# Patient Record
Sex: MALE | Race: BLACK OR AFRICAN AMERICAN
[De-identification: names, ages, dates, MRNs, and addresses within clinical notes are randomized per-mention and may not be internally consistent; named-entity substitution may affect disease eponyms.]

---

## 2018-06-06 ENCOUNTER — HOSPITAL ENCOUNTER (EMERGENCY)
Dept: HOSPITAL 15 - ER | Age: 43
Discharge: HOME | End: 2018-06-06
Payer: COMMERCIAL

## 2018-06-06 VITALS — HEIGHT: 74 IN | WEIGHT: 315 LBS | BODY MASS INDEX: 40.43 KG/M2

## 2018-06-06 VITALS — SYSTOLIC BLOOD PRESSURE: 126 MMHG | DIASTOLIC BLOOD PRESSURE: 84 MMHG

## 2018-06-06 DIAGNOSIS — J35.01: Primary | ICD-10-CM

## 2018-06-06 DIAGNOSIS — R35.8: ICD-10-CM

## 2018-06-06 DIAGNOSIS — K05.10: ICD-10-CM

## 2018-06-06 PROCEDURE — 99283 EMERGENCY DEPT VISIT LOW MDM: CPT

## 2018-06-06 PROCEDURE — 82962 GLUCOSE BLOOD TEST: CPT

## 2018-06-06 PROCEDURE — 96372 THER/PROPH/DIAG INJ SC/IM: CPT

## 2018-06-06 PROCEDURE — 81002 URINALYSIS NONAUTO W/O SCOPE: CPT

## 2018-10-15 ENCOUNTER — HOSPITAL ENCOUNTER (EMERGENCY)
Dept: HOSPITAL 15 - ER | Age: 43
Discharge: HOME | End: 2018-10-15
Payer: COMMERCIAL

## 2018-10-15 VITALS — SYSTOLIC BLOOD PRESSURE: 144 MMHG | DIASTOLIC BLOOD PRESSURE: 72 MMHG

## 2018-10-15 VITALS — BODY MASS INDEX: 40.43 KG/M2 | HEIGHT: 74 IN | WEIGHT: 315 LBS
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
## 2018-10-15 DIAGNOSIS — S83.91XA: Primary | ICD-10-CM
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
## 2018-10-15 PROCEDURE — 73562 X-RAY EXAM OF KNEE 3: CPT

## 2020-03-18 ENCOUNTER — HOSPITAL ENCOUNTER (EMERGENCY)
Dept: HOSPITAL 15 - ER | Age: 45
Discharge: HOME | End: 2020-03-18
Payer: MEDICAID

## 2020-03-18 VITALS — HEIGHT: 74 IN | WEIGHT: 315 LBS | BODY MASS INDEX: 40.43 KG/M2

## 2020-03-18 VITALS — SYSTOLIC BLOOD PRESSURE: 125 MMHG | DIASTOLIC BLOOD PRESSURE: 83 MMHG

## 2020-03-18 DIAGNOSIS — R19.7: Primary | ICD-10-CM

## 2025-02-21 NOTE — [facility]
CHEST RADIOGRAPH



Indication: sob



Technique: Single frontal view of the chest was obtained



Comparison: None



Findings/



IMPRESSION: 



Mild cardiomegaly otherwise no active cardiopulmonary disease. 



Electronically Signed by: José Miguel Cloud at 02/21/2025 22:50:16 PM

## 2025-02-21 NOTE — ED.PDOC
HPI Comments


49-year-old male emergency room by EMS for chest pain.  Patient is morbidly 

obese, states for the past few weeks, he has been having intermittent episodes 

of flu-like symptoms.  Two days ago patient started having shortness of breath, 

associated with substernal chest pains, pressure, burning,   constant, radiating

to his left arm and associated with palpitations. Upon arrival of paramedics 

noted patient to be at Afib in [facility]. Patient denies being diagnosed with Afib


Chief Complaint:  Chest Pain


Time Seen by MD:  22:32


Primary Care Provider:  unknown


Reviewed Notes:  Nurses Notes


Allergies:  


Coded Allergies:  


     NO KNOWN ALLERGIES (Unverified , 8/12/15)


Information Source:  Patient


Mode of Arrival:  EMS


Severity:  Moderate


Timing:  Days


Duration:  Intermittent


Prehospital treatment:  None


Location:  Substernal


Radiation:  Arm (L)


Quality:  Pressure, Burning


Onset:  With Light Exertion


Associated Signs and Symptoms:  SOB, Palpitations





Past Medical History


PAST MEDICAL HISTORY:  Denies


Surgical History:  Denies all surgeries





Family History


Family History:  Reviewed,noncontributory to illness





Social History


Smoker:  Non-Smoker


Alcohol:  Denies ETOH Use


Drugs:  Denies Drug Use


Lives In:  Home





Constitutional:  denies: chills, diaphoresis, fatigue, fever, malaise, sweats, 

weakness, others


EENTM:  denies: blurred vision, double vision, ear bleeding, ear discharge, ear 

drainage, ear pain, ear ringing, eye pain, eye redness, hearing loss, mouth 

pain, mouth swelling, nasal discharge, nose bleeding, nose congestion, nose 

pain, photophobia, tearing, throat pain, throat swelling, voice changes, others


Respiratory:  reports: SOB at rest, shortness of breath, SOB with excertion; 

denies: cough, hemoptysis, orthopnea, stridor, wheezing, others


Cardiovascular:  reports: chest pain, dizzy spells, diaphoresis, left arm pain, 

palpitations; denies: Dyspnea on exertion, edema, irregular heart beat, 

lightheadedness, PND, syncope, others


Gastrointestinal:  denies: abdomen distended, abdominal pain, blood streaked 

bowels, constipated, diarrhea, dysphagia, difficulty swallowing, hematemesis, 

melena, nausea, poor appetite, poor fluid intake, rectal bleeding, rectal pain, 

vomiting, others


Genitourinary:  denies: burning, dysuria, flank pain, frequency, hematuria, 

incontinence, penile discharge, penile sore, pain, testicle pain, testicle 

swelling, urgency, others


Neurological:  denies: dizziness, fainting, headache, left sided numbness, left 

sided weakness, numbness, paresthesia, pre-existing deficit, right sided 

numbness, right sided weakness, seizure, speech problems, tingling, tremors, 

weakness, others


Musculoskeletal:  denies: back pain, gout, joint pain, joint swelling, muscle 

pain, muscle stiffness, neck pain, others


Integumetry:  denies: bruises, change in color, change in hair/nails, dryness, 

laceration, lesions, lumps, rash, wounds, others


Allergic/Immunocompromised:  denies: Difficulty Healing, Frequent Infections, 

Hives, Itching, others


Hematologic/Lymphatic:  denies: anemia, blood clots, easy bleeding, easy 

bruising, swollen glands, others


Endocrine:  denies: excessive hunger, excessive sweating, excessive thirst, 

excessive urination, flushing, intolerance to cold, intolerance to heat, 

unexplained weight gain, unexplained weight loss, others


Psychiatric:  denies: anxiety, bipolar disorder, depression, hopeless, panic 

disorder, schizophrenia, sleepless, suicidal, others





Physical Exam


General Appearance:  No Apparent Distress, Normal


HEENT:  Normal ENT Inspection, Pharynx Normal, TMs Normal


Neck:  Full Range of Motion, Non-Tender, Normal, Normal Inspection


Respiratory:  Chest Non-Tender, Lungs Clear, No Accessory Muscle Use, No 

Respiratory Distress, Normal Breath Sounds


Cardiovascular:  No Edema, No JVD, No Murmur, No Gallop, Normal Peripheral 

Pulses, Regular Rate/Rhythm


Breast Exam:  Deferred


Gastrointestinal:  No Organomegaly, Non Tender, No Pulsatile Mass, Normal Bowel 

Sounds, Soft


Genitalia:  Deferred


Pelvic:  Deferred


Rectal:  Deferred


Extremities:  No calf tenderness, Normal capillary refill, Normal inspection, 

Normal range of motion, Non-tender, No pedal edema


Musculoskeletal :  


   Apperance:  Normal


Neurologic:  Alert, CNs II-XII nml as Tested, No Motor Deficits, Normal Affect, 

Normal Mood, No Sensory Deficits


Cerebellar Function:  Normal


Reflexes:  Normal


Skin:  Dry, Normal Color, Warm


Lymphatic:  No Adenopathy





EKG


EKG :  


   Pulse Rate (adult):  153


   Cardiac Rhythm:  Afib, VT


   Block:  RBBB


Comments


Left anterior fascicular block





Was a procedure done?


Was a procedure done?:  No





CP Differential Dx


Differential Diagnosis:  A-fib, Angina, Anxiety / Panic Attack, Electrolyte 

Disorder, Hyperventilation


Differential Diagnosis:  Angina, Chest Wall Pain, Costochondritis, Esophageal 

reflux/spasm, Gastritis, Myocardial Infarction





X-Ray, Labs, Meds, VS





                                   Vital Signs








  Date Time  Temp Pulse Resp B/P (MAP) Pulse Ox O2 Delivery O2 Flow Rate FiO2


 


2/21/25 23:01  133  110/76  Facial BiPAP Mask  45


 


2/21/25 22:32  153      


 


2/21/25 21:51 97.9 160 40 148/108 (121) 91   


 


2/21/25 21:49  153      








                                       Lab








Test


 2/22/25


00:39 2/22/25


00:14 2/21/25


23:15 2/21/25


23:02 Range/Units


 


 


Blood Gas Specimen Type Arterial      


 


Blood Gas Sample Site Left radial      


 


Blood Gas Patient Temperature 37.0      


 


Arterial Blood Date Drawn [id number]      


 


Arterial Blood pH 7.449     7.350-7.450  


 


Arterial Blood Partial


Pressure CO2 34.4 L


 


 


 


 35.0-48.0  mmHg





 


Arterial Blood Partial


Pressure O2 81.8 L


 


 


 


 83.0-108.0


mmHg


 


Arterial Blood HCO3


 23.3 


 


 


 


 21.0-28.0


mmol/L


 


Arterial Blood Oxygen


Saturation 96.3 


 


 


 


 94.0-98.0  %





 


Arterial Blood Base Excess


 0.0 


 


 


 


 -2.0-3.0


mmol/L


 


Arterial Blood Oxyhemoglobin 94.9     94.0-98.0  %


 


Arterial Blood


Carboxyhemoglobin 1.0 


 


 


 


 0.5-1.5  %





 


Arterial Blood Methemoglobin 0.5     0.0-1.5  %


 


Adolfo Test Modified      


 


Blood Gas Total Hemoglobin 16.40     13.5-17.5  g/dL


 


Blood Gas Modality Mask - bipap      


 


FiO2 % 45.0      


 


Blood Gas EPAP 5      


 


Blood Gas IPAP 10      


 


Lactic Acid Level  1.9    0.4-2.0  mmol/L


 


Influenza Type A Antigen   Negative   Negative  


 


Influenza Type B Antigen   Negative   Negative  


 


SARS-CoV-2 Antigen (Rapid)   Negative   NEGATIVE  


 


Troponin I High Sensitivity    141 *H </=54  ng/L


 


Test


 2/21/25


22:19 


 


 


 Range/Units


 


 


White Blood Count


 12.0 H


 


 


 


 4.4-10.8


10^3/uL


 


Red Blood Count


 5.40 


 


 


 


 4.5-5.90


10^6/uL


 


Hemoglobin 15.0     13.5-17.5  g/dL


 


Hematocrit 46.1     41.0-53.0  %


 


Mean Corpuscular Volume 85.5     80.0-100.0  fL


 


Mean Corpuscular Hemoglobin 27.9 L    28.0-32.0  pg


 


Mean Corpuscular Hemoglobin


Concent 32.6 


 


 


 


 32.0-36.0  g/dL





 


Red Cell Distribution Width 16.4 H    11.8-14.3  %


 


Platelet Count


 188 


 


 


 


 140-450


10^3/uL


 


Mean Platelet Volume 9.1     6.9-10.8  fL


 


Neutrophils (%) (Auto) 67.5     37.0-80.0  %


 


Lymphocytes (%) (Auto) 25.3     10.0-50.0  %


 


Monocytes (%) (Auto) 5.9     0.0-12.0  %


 


Eosinophils (%) (Auto) 0.5     0.0-7.0  %


 


Basophils (%) (Auto) 0.8     0.0-2.0  %


 


Neutrophils # (Auto)


 8.1 


 


 


 


 1.6-8.6  10


^3/uL


 


Lymphocytes # (Auto)


 3.0 


 


 


 


 0.4-5.4  10


^3/uL


 


Monocytes # (Auto) 0.7     0-1.3  10 ^3/uL


 


Eosinophils # (Auto) 0.1     0-0.8  10 ^3/uL


 


Basophils # (Auto) 0.1     0-0.2  10 ^3/uL


 


Nucleated Red Blood Cells 0.3      %


 


Sodium Level 140     136-145  mmol/L


 


Potassium Level 3.4 L    3.5-5.1  mmol/L


 


Chloride Level 103       mmol/L


 


Carbon Dioxide Level 25     20-31  mmol/L


 


Anion Gap 12     5-15  


 


Blood Urea Nitrogen 8 L    9-23  mg/dL


 


Creatinine


 1.11 


 


 


 


 0.700-1.30


mg/dL


 


Glomerular Filtration Rate


Calc 81 


 


 


 


 >90  mL/min





 


BUN/Creatinine Ratio 7.2 L    10.0-20.0  


 


Serum Glucose 116 H      mg/dL


 


Lactic Acid Level 3.0 *H    0.4-2.0  mmol/L


 


Calcium Level 9.4     8.7-10.4  mg/dL


 


Troponin I High Sensitivity 175 *H    </=54  ng/L


 


B-Type Natriuretic Peptide 235.99     0-100  pg/mL








                               Current Medications








 Medications


  (Trade)  Dose


 Ordered  Sig/Hardy


 Route  Start Time


 Stop Time Status Last Admin


 


 Sodium Chloride  1,000 ml @ 


 1,000 mls/hr  Q1H ONCE


 IV  2/21/25 22:00


 2/21/25 22:59 DC 2/21/25 22:23





 


 Levalbuterol HCl


  (Xopenex Medneb)  1.25 mg  ONCE  ONCE


 NEB  2/21/25 22:45


 2/21/25 22:46 DC 2/21/25 23:02





 


 Ipratropium


 Bromide


  (Atrovent Medneb)  0.5 mg  ONCE  ONCE


 NEB  2/21/25 22:45


 2/21/25 22:46 DC 2/21/25 23:01








CHEST RADIOGRAPH





Indication: sob


Technique: Single frontal view of the chest was obtained


Comparison: None





Findings/


IMPRESSION: 


Mild cardiomegaly otherwise no active cardiopulmonary disease. 


Time of 1ST Reevaluation:  22:26


Reevaluation 1ST:  Unchanged


Patient Education/Counseling:  Diagnosis, Treatment


Family Education/Counseling:  No Family Present





Departure 1


Departure


Time of Disposition:  01:01 (Patient with sinus symptoms concerning for sepsis .

 Patient has signs of volume overload and new onset AFib.  We will not give the 

patient a full fluid bolus because he has signs of volume overload.  We will 

admit patient for further workup and expert consultation)


Impression:  


   Primary Impression:  


   Shortness of breath


   Additional Impressions:  


   Atrial fibrillation with RVR


   Acute chest pain


Disposition:  09 ADMITTED AS INPATIENT


Admit to:  Tele


Condition:  Guarded





Critical Care Note


Critical Care Time?:  Yes (35 min-critical care time only)


Critical care comment:


Chest pain, shortness of breath


Authorized and Performed by: Debra Sarmiento MD


Total critical care time: Approximately 49 minutes


Due to a high probability of clinically significant, life threatening 

deterioration, the patient required my highest level of preparedness to i

ntervene emergently and I personally spent this critical care time directly and 

personally managing the patient. This critical care time included obtaining a 

history; examining the patient; pulse oximetry; ordering and review of studies; 

arranging urgent treatment with development of a management plan; evaluation of 

patient's response to treatment; frequent reassessment; and, discussions with 

other providers.


This critical care time was performed to assess and manage the high probability 

of imminent, life-threatening deterioration that could result in multi-organ 

failure. It was exclusive of separately billable procedures and treating other 

patients and teaching time.


Please see my other sections and the rest of the note for further information on

patient assessment and treatment.





Stability


Stability form required:  No





Heart Score


Heart Score:  








Heart Score Response (Comments) Value


 


History Moderate Suspicious 1


 


EKG Repolarization Disturb 1


 


Age                                     45-64 1


 


Risk Factors                            1 or 2 risk factors 1


 


Troponin >3 x's Normal limit 2


 


Total  6














I personally scribed for DEBRA SARMIENTO MD [facility] on 2/21/25 at 22:32.  

Electronically submitted by Gildardo Chaney ([email or web address]).


I personally scribed for DEBRA SARMIENTO MD ([facility] on 2/21/25 at 23:12.  

Electronically submitted by Gildardo Chaney ([email or web address]).





DEBRA SARMIENTO MD               Feb 21, 2025 22:32

## 2025-02-22 NOTE — [facility]
--------------- APPROVED REPORT --------------





EXAM: LIMITED Two-dimensional and M-mode echocardiogram with Doppler and color Doppler.



Blood Pressure: 125/87 mmHg



INDICATION

Elevated BNP



RISK FACTORS

Obesity:  Height: 6' 2", Weight: 450



DIMENSIONS 

LVDd5.4 (3.8-5.7cm)LA (2D)4.2 (1.9-4.0cm)Aortic Root5.0 (2.0-3.7cm)

LVDs4.6 (2.5-4.0cm)LA (MM) (1.9-4.0cm)Aortic Cusp Exc2.1 (1.5-2.0cm)

EF (%) 30.0 (55-70%)Rt. Atrium4.5 (1.9-4.0cm)Asc. Aorta cm

IVSd1.4 (0.7-1.1cm)RV (D) (1.8-2.4cm)

PWd1.4 (0.7-1.1cm)



Mitral Valve

MitralMitral Stenosis

E wave0.40m/sMV Mean GR.mmHg

E/A ratio0.02D MVAcm2



Aortic Valve

Aortic ValveAortic Stenosis

V10.70m/William Mean GR.1mmHg

V20.70m/William Peak GR.2mmHg

LVOT Diameter3.1 (1.8-2.4cm)Doppler AVA7.54cm2



Pulmonic Valve

V20.60m/s



Tricuspid Valve

TR Velocity2.60m/s

[id number]



 Other Information 

Quality : Technically LimitedRhythm : 

Technically limited study due to  body habitus.



Conclusion

lvef 30% by visual estimate

moderate LVH

RV enlarged

biatrial enlargement mild 

no severe valve abnormalities noted

## 2025-02-22 NOTE — DVHINCON2
ALEXMEME M AGACNP 2/22/25 1016:


Date Seen:  Feb 22, 2025


Referring Physician


Julita





Reason for Consultation


New onset Atrial Fibrillation with RVR





History of Present Illness


49-year-old male with no previous medical history, morbidly obese, presents to 

the hospital with progressively worsening shortness of breath and chest 

pressure.  Patient states that he has been having short of breath for a while 

now that for the last week was worsening, endorses flu-like symptoms cough 

congestion fever.  Patient was also having some chest pressure, substernal, 

intermittent, radiating to left arm, associated with palpitations.  Upon 

evaluation in the ER CXR showed mild cardiomegaly no congestion/edema.  .

 Troponin trending 175, 141, 199, 137.  Patient also found to be in atrial fi

brillation.  EKG reviewed and shows atrial fibrillation with rapid ventricular 

response at 153 beats per minute, lateral T-wave abnormality.  Patient denies 

any history of palpitations, irregular rhythms, or being on any blood thinners.





Past Medical History


Obesity





Past Surgical History


Denies previous cardiac surgeries





Family History


Denies pertinent family cardiac history


Social History


Denies alcohol, tobacco, or illicit drug use





Allergies:  


Coded Allergies:  


     NO KNOWN ALLERGIES (Unverified , 8/12/15)


Current Medications





                               Current Medications








 Medications


  (Trade)  Dose


 Ordered  Sig/Hardy


 Route


 PRN Reason  Start Time


 Stop Time Status Last Admin


 


 Ceftriaxone Sodium  50 ml @ 


 100 mls/hr  DAILY@09


 IV


   2/22/25 09:00


    2/22/25 08:36





 


 Vancomycin HCl  0 ml @ 0


 mls/hr  UD


 IV


   2/22/25 04:15


   UNV  





 


 Carvedilol


  (Coreg Tablet)  12.5 mg  Q12HR


 PO


   2/22/25 10:00


    2/22/25 08:36





 


 Furosemide


  (Lasix Injection)  20 mg  DAILY


 IV


   2/23/25 10:00


     





 


 Levalbuterol HCl


  (Xopenex Medneb)  1.25 mg  Q6HR


 NEB


   2/22/25 06:00


    2/22/25 06:20





 


 Ipratropium


 Bromide


  (Atrovent Medneb)  0.5 mg  Q4HPRN  PRN


 NEB


 SHORTNESS OF BREATH  2/22/25 04:15


    2/22/25 06:20





 


 Aspirin


  (Ecotrin Enteric


 Coated Tablet)  81 mg  DAILY


 PO


   2/23/25 10:00


     





 


 Sodium Chloride


  (Saline Lock Ns)  10 ml  Q8HR


 IV


   2/22/25 06:00


    2/22/25 08:19





 


 Acetaminophen/


 Hydrocodone Bitart


  (Norco 5/325MG


 Tab)  1 tab  Q4HP  PRN


 PO


 MODERATE PAIN (4-6 PAIN SCALE)  2/22/25 04:15


     





 


 Ondansetron HCl


  (Zofran)  4 mg  Q4HP  PRN


 IV


 NAUSEA / VOMITING  2/22/25 04:15


     





 


 Docusate Sodium


  (Colace Capsule)  100 mg  BIDPRN  PRN


 PO


 FOR CONSTIPATION  2/22/25 04:15


     





 


 Acetaminophen


  (Tylenol Tablet)  650 mg  Q6HP  PRN


 PO


 PAIN SCALE 1-3  OR TEMP>100.4  2/22/25 04:15


     





 


 Nitroglycerin


  (Ntrostat


 Sublingual)  0.4 mg  Q5MINP  PRN


 SL


 FOR CHEST PAIN  2/22/25 04:15


     





 


 Morphine Sulfate  2 mg  Q30M  PRN


 IV


 FOR CHEST PAIN  2/22/25 04:15


     





 


 Enoxaparin Sodium


  (Lovenox)  150 mg  Q12HR


 SC


   2/22/25 10:00


     














Review of Systems


Constitutional:  No: Fever, Chills, Sweats, Weakness, Malaise, Other


Eyes:  No: Pain, Vision change, Conjunctivae inflammation, Eyelid inflammation, 

Other, Redness


ENT:  No: Ear pain, Ear discharge, Nose pain, Nose discharge, Nose congestion, 

Mouth pain, Mouth swelling, Throat pain, Throat swelling, Other


Respiratory:  No:  Wheezing, Hemoptysis, Pleuritic Pain, Sputum, Wheezing, Other

positive:  Cough, Shortness of breath, SOB with exertion,


Cardiovascular: ; No:   Orthopnea, Paroxysmal Noc. Dyspnea, , Lt Headedness, 

Other positive:  Chest Pain Palpitations,Edema, greater on the left


Gastrointestinal:  No: Nausea, Vomiting, Abdominal Pain, Diarrhea, Constipation,

Melena, Hematochezia, Other


Genitourinary:  No Dysuria, No Frequency, No Incontinence, No Hematuria, No 

Retention, No Other


Musculoskeletal:  neck pain; No: other, shoulder pain, arm pain, back pain, hand

pain, leg pain, foot pain


Skin:  No: Rash, Lesions, Jaundice, Bruising, Other


Neurological:  Other (Dizziness, headache.); No: Weakness, Numbness, 

Incoordination, Change in speech, Confusion, Seizures





Vital Signs





                                   Vital Signs








  Date Time  Temp Pulse Resp B/P (MAP) Pulse Ox O2 Delivery O2 Flow Rate FiO2


 


2/22/25 08:46 98.1 110 25 115/74 (88) 97   





 98.1       


 


2/22/25 08:00      Bi-Pap+  97





        97


 


2/22/25 01:06       8 








Physical Exam


General appearance:  Morbidly obese, in acute distress.


HEENT:  Exam shows:  Normocephalic, atraumatic, PERRLA, EOMI


Neck:  Supple, no bruits


Chest:  Equal chest excursion bilaterally.  Breath sounds rales diminished.


Heart:  Rhythm:  Irregular /regular rate; no murmur or gallop


Abdomen:  Exam shows:  Soft, nontender, nondistended


Musculoskeletal:  No clubbing, no cyanosis, + lower extremity edema


Dermatology:  Skin warm, moist.


Neurological:  Exam shows:  Alert and oriented x4, normal speech


Available prior records, labs, EKG, rhythm strips reviewed and interpreted





Labs/Diagnostic Data





                                      Labs








Test


 2/22/25


06:36 2/22/25


06:34 2/22/25


06:14 2/22/25


00:39 Range/Units


 


 


Thyroid Stimulating Hormone


(TSH) 1.15 


 


 


 


 0.55-4.78


uIU/mL


 


Troponin I High Sensitivity  137 *H   </=54  ng/L


 


White Blood Count


 


 


 10.7 


 


 4.4-10.8


10^3/uL


 


Red Blood Count


 


 


 5.51 


 


 4.5-5.90


10^6/uL


 


Hemoglobin   15.3   13.5-17.5  g/dL


 


Hematocrit   47.3   41.0-53.0  %


 


Mean Corpuscular Volume   85.9   80.0-100.0  fL


 


Mean Corpuscular Hemoglobin   27.8 L  28.0-32.0  pg


 


Mean Corpuscular Hemoglobin


Concent 


 


 32.4 


 


 32.0-36.0  g/dL





 


Red Cell Distribution Width   16.4 H  11.8-14.3  %


 


Platelet Count


 


 


 173 


 


 140-450


10^3/uL


 


Mean Platelet Volume   9.1   6.9-10.8  fL


 


Neutrophils (%) (Auto)   73.0   37.0-80.0  %


 


Lymphocytes (%) (Auto)   19.1   10.0-50.0  %


 


Monocytes (%) (Auto)   7.3   0.0-12.0  %


 


Eosinophils (%) (Auto)   0.1   0.0-7.0  %


 


Basophils (%) (Auto)   0.5   0.0-2.0  %


 


Neutrophils # (Auto)


 


 


 7.8 


 


 1.6-8.6  10


^3/uL


 


Lymphocytes # (Auto)


 


 


 2.0 


 


 0.4-5.4  10


^3/uL


 


Monocytes # (Auto)   0.8   0-1.3  10 ^3/uL


 


Eosinophils # (Auto)   0   0-0.8  10 ^3/uL


 


Basophils # (Auto)   0.1   0-0.2  10 ^3/uL


 


Nucleated Red Blood Cells   0.1    %


 


Sodium Level   139   136-145  mmol/L


 


Potassium Level   3.4 L  3.5-5.1  mmol/L


 


Chloride Level   104     mmol/L


 


Carbon Dioxide Level   22   20-31  mmol/L


 


Anion Gap   13   5-15  


 


Blood Urea Nitrogen   7 L  9-23  mg/dL


 


Creatinine


 


 


 1.17 


 


 0.700-1.30


mg/dL


 


Glomerular Filtration Rate


Calc 


 


 76 


 


 >90  mL/min





 


BUN/Creatinine Ratio   6.0 L  10.0-20.0  


 


Serum Glucose   114 H    mg/dL


 


Calcium Level   8.9   8.7-10.4  mg/dL


 


Total Bilirubin   1.5 H  0.2-1.0  mg/dL


 


Aspartate Amino Transferase


(AST) 


 


 31 


 


 13-40  U/L





 


Alanine Aminotransferase (ALT)   28   7-40  U/L


 


Alkaline Phosphatase   53     U/L


 


Total Protein   7.8   5.7-8.2  g/dL


 


Albumin   3.9   3.2-4.8  g/dL


 


Triglycerides Level   102   < 150  mg/dL


 


Cholesterol Level   98   < 200  mg/dL


 


LDL Cholesterol   60   < 100  mg/dL


 


HDL Cholesterol   23 L  40-59  mg/dL


 


Blood Gas Specimen Type    Arterial   


 


Blood Gas Sample Site    Left radial   


 


Blood Gas Patient Temperature    37.0   


 


Arterial Blood Date Drawn    [id number]   


 


Arterial Blood pH    7.449  7.350-7.450  


 


Arterial Blood Partial


Pressure CO2 


 


 


 34.4 L


 35.0-48.0  mmHg





 


Arterial Blood Partial


Pressure O2 


 


 


 81.8 L


 83.0-108.0


mmHg


 


Arterial Blood HCO3


 


 


 


 23.3 


 21.0-28.0


mmol/L


 


Arterial Blood Oxygen


Saturation 


 


 


 96.3 


 94.0-98.0  %





 


Arterial Blood Base Excess


 


 


 


 0.0 


 -2.0-3.0


mmol/L


 


Arterial Blood Oxyhemoglobin    94.9  94.0-98.0  %


 


Arterial Blood


Carboxyhemoglobin 


 


 


 1.0 


 0.5-1.5  %





 


Arterial Blood Methemoglobin    0.5  0.0-1.5  %


 


Adolfo Test    Modified   


 


Blood Gas Total Hemoglobin    16.40  13.5-17.5  g/dL


 


Blood Gas Modality    Mask - bipap   


 


FiO2 %    45.0   


 


Blood Gas EPAP    5   


 


Blood Gas IPAP    10   


 


Test


 2/22/25


00:14 2/21/25


23:15 2/21/25


22:19 


 Range/Units


 


 


Lactic Acid Level 1.9     0.4-2.0  mmol/L


 


Influenza Type A Antigen  Negative    Negative  


 


Influenza Type B Antigen  Negative    Negative  


 


SARS-CoV-2 Antigen (Rapid)  Negative    NEGATIVE  


 


B-Type Natriuretic Peptide   235.99   0-100  pg/mL











Assessment


Acute hypoxic respiratory failure 


New onset atrial fibrillation with episode of RVR


Chest Pain


Positive Troponins


Hypokalemia


Sepsis


Plan/Recommendation


* On Bipap. 


* Diuresis with lasix.


* Follow up ECHO. 


* Continue aspirin


* Check D-Dimer, follow up BLE US


* Ischemic workup once respiratory status improves.


* Monitor and replace electrolytes


* Continue empiric antibiotics


* Continue full dose AC with Lovenox for stroke prophylaxis. Plan to change to 

  DOAC upon DC. Continue coreg.





Case Discussed with Dr Mar. Continue supportive care and rate management. 

Ischemic workup in the future once respiratory status improves. Follow up ECHO.





Critical care, time spent:  40 minutes





This medical document was created using an electronic medical record system with

voice recognition software and computerized dictation system.  Although this 

document has been carefully reviewed, there might still be some phonetic and 

typographical errors.  Occasional wrong-word or ``sound-alike substitutions 

may have occurred due to the inherent limitations of voice recognition software.

 These areas are purely typographical due to imperfections of the software 

programs and do not reflect any compromise in the patient's medical care.  

Please read the chart carefully and recognize, using context, where these 

substitutions have occurred.





Thank you for allowing me to participate in the management of this patient.





The treatment plan was discussed with and agreed upon by patient/family 

including requesting consultants and ordering of imaging/procedures.


Plan discussed with:  Patient


NYHA








Physical activity limitations: Class4(Severe)discomfort











Date of Service:  Feb 22, 2025


Billing Provider:  MEME JOHNSON [facility]


Cardiology Common Codes:  99223-INITIAL  INP/OBS CARE (High), 48507-OCZRWLYG 

CARE 30-74 MIN





ALISSA MAR MD 2/22/25 1351:


Allergies:  


Coded Allergies:  


     NO KNOWN ALLERGIES (Unverified , 8/12/15)





Plan/Recommendation


AGREE WITH NP PLAN FORMULATED TOGETHER


PT SEEN, 


HAS SEVERE CHF, CONT LASIX


ON BIPAP, D DIMER VERY HIGH--DEFER TO PRIMARY TEAM, 


VENOUS US PENDING


trop is mild elevation ,


Plan discussed with:  Patient, Other (rn)











MEME JOHNSON AGACNP      Feb 22, 2025 10:16


ALISSA MAR MD              Feb 22, 2025 13:51

## 2025-02-22 NOTE — DVHINCON2
Date of service:  Feb 22, 2025


Referring Physician


Ciaran Cancino NP





Reason for Consultation


Acute hypoxic respiratory failure





History of Present Illness


A 49-year-old man, morbidly obese, who denies past medical history presented to 

ED on 2/21/25 with complaint of chest pain. Patient's symptoms progressively 

worsened with substernal chest pain, pressure-like in nature, burning, constant,

radiating to his left arm, with associated palpitations and shortness of breath 

prompting this visit.  ED workup shows WBC 12.0, platelets 188, sodium 140, 

potassium 3.4, BUN 8, creatinine 1.11, GFR 81, glucose 116, .99, calcium 

9.4, lactic acid 3.0 trending down to 1.9, troponin 175.  O2 saturation 95% on 

BiPAP. Chest x-ray revealing mild cardiomegaly.  Patient was admitted for 

further care and pulmonary consultation is requested for evaluation and 

management of acute hypoxic respiratory failure





Review of Systems:


14-point review of systems negative unless otherwise noted above.





Past Medical History:


Denies





Past Surgical History:


Denies.





Medications:


Reviewed.





Allergies:


No known drug allergies.





Family History:


No family history of premature CAD. No family history of lung disorders.





Social History:


Nonsmoker. No alcohol or illicit drug use.





Allergies:  


Coded Allergies:  


     NO KNOWN ALLERGIES (Unverified , 8/12/15)


Current Medications





                               Current Medications








 Medications


  (Trade)  Dose


 Ordered  Sig/Hardy


 Route


 PRN Reason  Start Time


 Stop Time Status Last Admin


 


 Ceftriaxone Sodium  50 ml @ 


 100 mls/hr  DAILY@09


 IV


   2/22/25 09:00


    2/22/25 08:36





 


 Vancomycin HCl  0 ml @ 0


 mls/hr  UD


 IV


   2/22/25 04:15


     





 


 Carvedilol


  (Coreg Tablet)  12.5 mg  Q12HR


 PO


   2/22/25 10:00


    2/22/25 08:36





 


 Furosemide


  (Lasix Injection)  20 mg  DAILY


 IV


   2/23/25 10:00


 2/22/25 10:54 DC  





 


 Levalbuterol HCl


  (Xopenex Medneb)  1.25 mg  Q6HR


 NEB


   2/22/25 06:00


    2/22/25 20:54





 


 Ipratropium


 Bromide


  (Atrovent Medneb)  0.5 mg  Q4HPRN  PRN


 NEB


 SHORTNESS OF BREATH  2/22/25 04:15


    2/22/25 20:54





 


 Aspirin


  (Ecotrin Enteric


 Coated Tablet)  81 mg  DAILY


 PO


   2/23/25 10:00


     





 


 Sodium Chloride


  (Saline Lock Ns)  10 ml  Q8HR


 IV


   2/22/25 06:00


    2/22/25 08:19





 


 Acetaminophen/


 Hydrocodone Bitart


  (Norco 5/325MG


 Tab)  1 tab  Q4HP  PRN


 PO


 MODERATE PAIN (4-6 PAIN SCALE)  2/22/25 04:15


     





 


 Ondansetron HCl


  (Zofran)  4 mg  Q4HP  PRN


 IV


 NAUSEA / VOMITING  2/22/25 04:15


     





 


 Docusate Sodium


  (Colace Capsule)  100 mg  BIDPRN  PRN


 PO


 FOR CONSTIPATION  2/22/25 04:15


     





 


 Acetaminophen


  (Tylenol Tablet)  650 mg  Q6HP  PRN


 PO


 PAIN SCALE 1-3  OR TEMP>100.4  2/22/25 04:15


     





 


 Nitroglycerin


  (Ntrostat


 Sublingual)  0.4 mg  Q5MINP  PRN


 SL


 FOR CHEST PAIN  2/22/25 04:15


     





 


 Morphine Sulfate  2 mg  Q30M  PRN


 IV


 FOR CHEST PAIN  2/22/25 04:15


     





 


 Enoxaparin Sodium


  (Lovenox)  150 mg  Q12HR


 SC


   2/22/25 10:00


    2/22/25 10:35





 


 Furosemide


  (Lasix Injection)  40 mg  DAILY


 IV


   2/23/25 10:00


   UNV  





 


 Furosemide


  (Lasix Injection)  40 mg  DAILY


 IV


   2/23/25 10:00


     





 


 Vancomycin HCl  250 ml @ 


 250 mls/hr  Q8H


 IV


   2/22/25 18:15


    2/22/25 18:42














Vital Signs





                                   Vital Signs








  Date Time  Temp Pulse Resp B/P (MAP) Pulse Ox O2 Delivery O2 Flow Rate FiO2


 


2/22/25 21:00  126 24  95   


 


2/22/25 20:54      Nasal Cannula* 4 36


 


2/22/25 18:00    126/86 (99)    


 


2/22/25 16:01 98.4       





 98.4       








Physical Exam


Gen.: Patient lying in bed in no apparent distress. On BiPAP


Head: Normocephalic, atraumatic.


Eyes: EOMI/PERRLA.


Ears: Normal hearing. Normal anatomy.


Neck/trachea: Trachea midline, supple.


Nose: Normal external anatomy.


Mouth: Moist mucous membranes.


Chest: Decreased air entry bilaterally. No wheezing or rhonchi.


Cardiovascular: Positive S1, positive S2. Regular rate and rhythm.


Abdomen: Positive bowel sounds in all 4 quadrants. Soft, non-tender, non-

distended.


: Deferred.


Rectal: Deferred.


Skin: Warm, dry. Intact.


Extremities: 2+ radial pulses bilaterally. No lower extremity edema.


Neuro: Awake, alert, oriented x3. No gross motor or sensory deficits. Cranial 

nerves II through XII intact. Gait not assessed.





Labs/Diagnostic Data





                                      Labs








Test


 2/22/25


11:58 2/22/25


09:53 2/22/25


06:36 2/22/25


06:14 Range/Units


 


 


Troponin I High Sensitivity 125 *H    </=54  ng/L


 


D-Dimer, Quantitative


 


 13.04 H


 


 


 0.0-0.49  mg/L


FEU


 


Thyroid Stimulating Hormone


(TSH) 


 


 1.15 


 


 0.55-4.78


uIU/mL


 


White Blood Count


 


 


 


 10.7 


 4.4-10.8


10^3/uL


 


Red Blood Count


 


 


 


 5.51 


 4.5-5.90


10^6/uL


 


Hemoglobin    15.3  13.5-17.5  g/dL


 


Hematocrit    47.3  41.0-53.0  %


 


Mean Corpuscular Volume    85.9  80.0-100.0  fL


 


Mean Corpuscular Hemoglobin    27.8 L 28.0-32.0  pg


 


Mean Corpuscular Hemoglobin


Concent 


 


 


 32.4 


 32.0-36.0  g/dL





 


Red Cell Distribution Width    16.4 H 11.8-14.3  %


 


Platelet Count


 


 


 


 173 


 140-450


10^3/uL


 


Mean Platelet Volume    9.1  6.9-10.8  fL


 


Neutrophils (%) (Auto)    73.0  37.0-80.0  %


 


Lymphocytes (%) (Auto)    19.1  10.0-50.0  %


 


Monocytes (%) (Auto)    7.3  0.0-12.0  %


 


Eosinophils (%) (Auto)    0.1  0.0-7.0  %


 


Basophils (%) (Auto)    0.5  0.0-2.0  %


 


Neutrophils # (Auto)


 


 


 


 7.8 


 1.6-8.6  10


^3/uL


 


Lymphocytes # (Auto)


 


 


 


 2.0 


 0.4-5.4  10


^3/uL


 


Monocytes # (Auto)    0.8  0-1.3  10 ^3/uL


 


Eosinophils # (Auto)    0  0-0.8  10 ^3/uL


 


Basophils # (Auto)    0.1  0-0.2  10 ^3/uL


 


Nucleated Red Blood Cells    0.1   %


 


Sodium Level    139  136-145  mmol/L


 


Potassium Level    3.4 L 3.5-5.1  mmol/L


 


Chloride Level    104    mmol/L


 


Carbon Dioxide Level    22  20-31  mmol/L


 


Anion Gap    13  5-15  


 


Blood Urea Nitrogen    7 L 9-23  mg/dL


 


Creatinine


 


 


 


 1.17 


 0.700-1.30


mg/dL


 


Glomerular Filtration Rate


Calc 


 


 


 76 


 >90  mL/min





 


BUN/Creatinine Ratio    6.0 L 10.0-20.0  


 


Serum Glucose    114 H   mg/dL


 


Calcium Level    8.9  8.7-10.4  mg/dL


 


Total Bilirubin    1.5 H 0.2-1.0  mg/dL


 


Aspartate Amino Transferase


(AST) 


 


 


 31 


 13-40  U/L





 


Alanine Aminotransferase (ALT)    28  7-40  U/L


 


Alkaline Phosphatase    53    U/L


 


Total Protein    7.8  5.7-8.2  g/dL


 


Albumin    3.9  3.2-4.8  g/dL


 


Triglycerides Level    102  < 150  mg/dL


 


Cholesterol Level    98  < 200  mg/dL


 


LDL Cholesterol    60  < 100  mg/dL


 


HDL Cholesterol    23 L 40-59  mg/dL


 


Test


 2/22/25


00:39 2/22/25


00:14 2/21/25


23:15 2/21/25


22:19 Range/Units


 


 


Blood Gas Specimen Type Arterial      


 


Blood Gas Sample Site Left radial      


 


Blood Gas Patient Temperature 37.0      


 


Arterial Blood Date Drawn [id number]      


 


Arterial Blood pH 7.449     7.350-7.450  


 


Arterial Blood Partial


Pressure CO2 34.4 L


 


 


 


 35.0-48.0  mmHg





 


Arterial Blood Partial


Pressure O2 81.8 L


 


 


 


 83.0-108.0


mmHg


 


Arterial Blood HCO3


 23.3 


 


 


 


 21.0-28.0


mmol/L


 


Arterial Blood Oxygen


Saturation 96.3 


 


 


 


 94.0-98.0  %





 


Arterial Blood Base Excess


 0.0 


 


 


 


 -2.0-3.0


mmol/L


 


Arterial Blood Oxyhemoglobin 94.9     94.0-98.0  %


 


Arterial Blood


Carboxyhemoglobin 1.0 


 


 


 


 0.5-1.5  %





 


Arterial Blood Methemoglobin 0.5     0.0-1.5  %


 


Adolfo Test Modified      


 


Blood Gas Total Hemoglobin 16.40     13.5-17.5  g/dL


 


Blood Gas Modality Mask - bipap      


 


FiO2 % 45.0      


 


Blood Gas EPAP 5      


 


Blood Gas IPAP 10      


 


Lactic Acid Level  1.9    0.4-2.0  mmol/L


 


Influenza Type A Antigen   Negative   Negative  


 


Influenza Type B Antigen   Negative   Negative  


 


SARS-CoV-2 Antigen (Rapid)   Negative   NEGATIVE  


 


B-Type Natriuretic Peptide    235.99  0-100  pg/mL











Assessment


Impression:


Acute hypoxic respiratory failure


CHF exacerbation


Dyspnea


Morbid obesity BMI 57.9


Elevated troponin





Plan:


On BiPAP - 10/5, RR 12, FiO2 40%


Titrate to keep O2 sats above 92%.


Taper FiO2 as tolerated.





Continue antibiotics


Follow up cultures





F/u Cardiology recs


Elevated troponin


Follow up Echo report





Diurese w/ Lasix


Monitor renal function.


Monitor electrolytes. Supplement as necessary.


Monitor ins and outs.





Diet and lifestyle modifications for weight reduction


Morbid obesity - complicates all care





Recommend outpatient sleep study to r/o COLLEEN.





DVT prophylaxis.





Prognosis: Poor given patient's multiple co-morbidities.





Rest of plan per hospitalist and other consultants.





Thank you, ALPA Cancino, for allowing me to participate in this patient's care.


Further recommendations will depend on the patient's clinical course.


Please do not hesitate to contact me if you have any questions or concerns.





This medical document was created using an electronic medical record system with

Dragon computerized dictation system. Although these documentations are being 

carefully reviewed, there may still be some phonetic and typographical changes. 

The errors are purely typographical, due to imperfection on the software 

program, and do not reflect any compromise in the patient's medical care.


Plan discussed with:  Patient, Other (AD Esqueda/ALPA Cancino/MD)











MARIANNA BARNETT MD             Feb 22, 2025 21:54

## 2025-02-22 NOTE — DVH
EXAM:  US Duplex Bilateral Lower Extremities Veins



CLINICAL INDICATION:   BLE edema



TECHNIQUE:  Real-time duplex ultrasound scan of the bilateral lower extremity veins integrating B-mod
e two-dimensional vascular structure, Doppler spectral analysis, color flow Doppler imaging and compr
ession.



COMPARISON:   None



FINDINGS:



 RIGHT DEEP VEINS:  Unremarkable.  No DVT in the right common femoral, femoral, proximal deep femoral
 or popliteal veins.  The veins demonstrate normal color flow, are normally compressible, with normal
 phasic flow and/or augmentation response.



 RIGHT SUPERFICIAL VEINS:  Unremarkable.  No thrombus in the visualized right great saphenous vein.



 LEFT DEEP VEINS:  Unremarkable.  No DVT in the left common femoral, femoral, proximal deep femoral o
r popliteal veins.  The veins demonstrate normal color flow, are normally compressible, with normal p
hasic flow and/or augmentation response.



 LEFT SUPERFICIAL VEINS:  Unremarkable.  No thrombus in the visualized left great saphenous vein.



 SOFT TISSUES:  No acute findings.  No popliteal cyst.



 OTHER FINDINGS:  .  None.



IMPRESSION:     



 No DVT.



Electronically Signed by: Frank Wagoner at 02/22/2025 12:29:43 PM

## 2025-02-22 NOTE — DVHHP2
History of Present Illness


Reason for Visit:  Atrial fibrillation with RVR


History of Present Illness


The patient is a 49-year-old male morbidly obese who denies past medical history

presented to [facility] ED with complaint of chest pain. Patient's 

symptoms progressively get worse with substernal chest pain, pressure-like in 

nature, burning, constant, radiating to his left arm, associated palpitations, 

shortness of breaths, getting worse today that prompted this visit. Patient was 

seen evaluated in the ED, laboratory data shows WBC 12.0, platelets 188, sodium 

140, potassium 3.4, BUN 8, creatinine 1.11, GFR 81, glucose 116, .99, 

calcium 9.4, lactic acid 3.0 trending down to 1.9, troponin 175, blood pressure 

122/92, heart rate 160 trending down to 110, temperature 97.9 F, O2 saturation 

95% on BiPAP. Chest x-ray revealing mild cardiomegaly no active cardiopulmonary 

disease. Please see medication orders section in the computer. On my assessment,

patient denied chest pain this moment, no headache, no dizziness, no nausea, no 

vomiting, no fever, no chills. Patient was admitted for further evaluation and 

medical management.


Past Medical History


Denies past medical history


Past Surgical History


Denies all surgeries


Family History


Reviewed, noncontributory to the management of this case.


Past Social History


The patient lives at home, denies smoking, alcohol or illicit drugs abuse.





Review of Systems


Constitutional:  No: Fever, Chills, Sweats, Weakness, Malaise, Other


Eyes:  No: Pain, Vision change, Conjunctivae inflammation, Eyelid inflammation, 

Other, Redness


ENT:  No: Ear pain, Ear discharge, Nose pain, Nose discharge, Nose congestion, 

Mouth pain, Mouth swelling, Throat pain, Throat swelling, Other


Respiratory:  Shortness of breath, SOB with excertion, Other (SOB at rest); No: 

Cough, Dry, Wheezing, Hemoptysis, Pleuritic Pain, Sputum, Wheezing


Cardiovascular:  Chest Pain, Palpitations, Other (Dizzy spells, left arm pain, 

diaphoresis.); No: Orthopnea, Paroxysmal Noc. Dyspnea, Edema, Lt Headedness


Gastrointestinal:  No: Nausea, Vomiting, Abdominal Pain, Diarrhea, Constipation,

Melena, Hematochezia, Other


Genitourinary:  No Dysuria, No Frequency, No Incontinence, No Hematuria, No 

Retention, No Other


Musculoskeletal:  No: other, neck pain, shoulder pain, arm pain, back pain, hand

pain, leg pain, foot pain


Skin:  No: Rash, Lesions, Jaundice, Bruising, Other


Neurological:  No: Weakness, Numbness, Incoordination, Change in speech, 

Confusion, Seizures, Other


Allergies:  


Coded Allergies:  


     NO KNOWN ALLERGIES (Unverified , 8/12/15)





Exam


Vital Signs





Vital Signs








  Date Time  Temp Pulse Resp B/P (MAP) Pulse Ox O2 Delivery O2 Flow Rate FiO2


 


25 04:13  109  112/76 98 Facial BiPAP Mask  55


 


25 03:30 97.9  30     





 97.9       


 


25 01:06       8 








General Appearance:  Alert, Oriented X3, Cooperative, No acute distress


HEENT:  Atraumatic, PERRLA, EOMI, Mucous membr. moist/pink


Respiratory:  Normal air movement, Other (Wheezing)


Cardiovascular:  Regular rate, Normal S1, Normal S2, No murmurs


Abdominal:  Normal bowel sounds, Soft, No tenderness, No hepatospenomegaly, No 

masses


Extremities:  No clubbing, No cyanosis, No edema, Normal pulses, No 

tenderness/swelling


Skin:  No rashes, No breakdown, No significant lesion


Neuro:  Normal gait, Normal speech, Strength at 5/5 X4 ext, Normal tone, 

Sensation intact, Cranial nerves 3-12 NL, Reflexes 2+


Psych/Mental Status:  Mental status NL, Mood NL





Labs/Xrays





                                      Labs








Test


 25


01:22 25


00:39 25


00:14 25


23:15 Range/Units


 


 


Troponin I High Sensitivity 199 *H    </=54  ng/L


 


Blood Gas Specimen Type  Arterial     


 


Blood Gas Sample Site  Left radial     


 


Blood Gas Patient Temperature  37.0     


 


Arterial Blood Date Drawn  [id number]     


 


Arterial Blood pH  7.449    7.350-7.450  


 


Arterial Blood Partial


Pressure CO2 


 34.4 L


 


 


 35.0-48.0  mmHg





 


Arterial Blood Partial


Pressure O2 


 81.8 L


 


 


 83.0-108.0


mmHg


 


Arterial Blood HCO3


 


 23.3 


 


 


 21.0-28.0


mmol/L


 


Arterial Blood Oxygen


Saturation 


 96.3 


 


 


 94.0-98.0  %





 


Arterial Blood Base Excess


 


 0.0 


 


 


 -2.0-3.0


mmol/L


 


Arterial Blood Oxyhemoglobin  94.9    94.0-98.0  %


 


Arterial Blood


Carboxyhemoglobin 


 1.0 


 


 


 0.5-1.5  %





 


Arterial Blood Methemoglobin  0.5    0.0-1.5  %


 


Adolfo Test  Modified     


 


Blood Gas Total Hemoglobin  16.40    13.5-17.5  g/dL


 


Blood Gas Modality  Mask - bipap     


 


FiO2 %  45.0     


 


Blood Gas EPAP  5     


 


Blood Gas IPAP  10     


 


Lactic Acid Level   1.9   0.4-2.0  mmol/L


 


Influenza Type A Antigen    Negative  Negative  


 


Influenza Type B Antigen    Negative  Negative  


 


SARS-CoV-2 Antigen (Rapid)    Negative  NEGATIVE  


 


Test


 25


22:19 


 


 


 Range/Units


 


 


White Blood Count


 12.0 H


 


 


 


 4.4-10.8


10^3/uL


 


Red Blood Count


 5.40 


 


 


 


 4.5-5.90


10^6/uL


 


Hemoglobin 15.0     13.5-17.5  g/dL


 


Hematocrit 46.1     41.0-53.0  %


 


Mean Corpuscular Volume 85.5     80.0-100.0  fL


 


Mean Corpuscular Hemoglobin 27.9 L    28.0-32.0  pg


 


Mean Corpuscular Hemoglobin


Concent 32.6 


 


 


 


 32.0-36.0  g/dL





 


Red Cell Distribution Width 16.4 H    11.8-14.3  %


 


Platelet Count


 188 


 


 


 


 140-450


10^3/uL


 


Mean Platelet Volume 9.1     6.9-10.8  fL


 


Neutrophils (%) (Auto) 67.5     37.0-80.0  %


 


Lymphocytes (%) (Auto) 25.3     10.0-50.0  %


 


Monocytes (%) (Auto) 5.9     0.0-12.0  %


 


Eosinophils (%) (Auto) 0.5     0.0-7.0  %


 


Basophils (%) (Auto) 0.8     0.0-2.0  %


 


Neutrophils # (Auto)


 8.1 


 


 


 


 1.6-8.6  10


^3/uL


 


Lymphocytes # (Auto)


 3.0 


 


 


 


 0.4-5.4  10


^3/uL


 


Monocytes # (Auto) 0.7     0-1.3  10 ^3/uL


 


Eosinophils # (Auto) 0.1     0-0.8  10 ^3/uL


 


Basophils # (Auto) 0.1     0-0.2  10 ^3/uL


 


Nucleated Red Blood Cells 0.3      %


 


Sodium Level 140     136-145  mmol/L


 


Potassium Level 3.4 L    3.5-5.1  mmol/L


 


Chloride Level 103       mmol/L


 


Carbon Dioxide Level 25     20-31  mmol/L


 


Anion Gap 12     5-15  


 


Blood Urea Nitrogen 8 L    9-23  mg/dL


 


Creatinine


 1.11 


 


 


 


 0.700-1.30


mg/dL


 


Glomerular Filtration Rate


Calc 81 


 


 


 


 >90  mL/min





 


BUN/Creatinine Ratio 7.2 L    10.0-20.0  


 


Serum Glucose 116 H      mg/dL


 


Calcium Level 9.4     8.7-10.4  mg/dL


 


B-Type Natriuretic Peptide 235.99     0-100  pg/mL








PATIENT: JOSE PAYNE    ACCT: [id number]        UNIT: [id number]


: 1975           LOC: ER                   ROOM / BED:  / 


AGE / SEX: 49 / M         ADM STATUS: REG ER        SERVICE DT: 25


ORDERING PHYSICIAN: DEBRA LOREDO MD


PROCEDURE(s): CXRP - CHEST PORTABLE


REASON: sob


ORDER NUMBER(s): [id number], ACCESSION NUMBER(s): [id number]





CHEST RADIOGRAPH


Indication: sob


Technique: Single frontal view of the chest was obtained


Comparison: None





Findings/


IMPRESSION: 


Mild cardiomegaly otherwise no active cardiopulmonary disease.





Assessment/Plan


Assessment/Plan


Acute chest pain


Atrial fibrillation with RVR


Elevated troponin


Sepsis, unspecified organism


Acute respiratory failure with hypoxia


Elevated brain natriuretic peptide level





Plan


1.  Admit to telemetry unit


2.  Breathing treatment


3.  Pain control management


4.  IV antibiotic management


5.  Management of fluids and electrolytes


6.  Consultation for pulmonology/cardiology


7.  Diagnostic test chest x-ray


8.  DVT prophylaxis on aspirin


9.  Repeat labs CBC, CMP in a.m.


10. Home medication reviewed and reconciled


11. Continue with current medical management


12. Treatment plan discussed with patient and RN. Patient verbalized 

understanding.


Plan discussed with:  Patient, Other (RN)


My Orders





                          Orders - REECE SPARKS DNP








Procedure Category Date Status





  Time 


 


Complete Blood Count LAB 25 Verified





  04:12 


 


Comprehensive LAB 25 Verified





Metabolic Panel  04:12 


 


* Cardiology Consult CONS 25 Verified





  04:12 


 


Ceftriaxone Ivpb PHA 25 Verified





Rocephin  09:00 


 


Vancomycin Per PHA 25 Verified





Pharmacy  04:15 


 


Carvedilol Tablet PHA 25 Verified





 (Coreg Tablet)  10:00 


 


Furosemide Injection PHA 25 Verified





 (Lasix Injection)  04:15 


 


Furosemide Injection PHA 25 Verified





 (Lasix Injection)  10:00 


 


Levalbuterol Hcl PHA 25 Verified





 (Xopenex Medneb)  06:00 


 


Ipratropium Medneb PHA 25 Verified





 (Atrovent Medneb)  04:15 


 


Aspirin Enteric PHA 25 Verified





Coated Tablet  04:15 


 


Aspirin Enteric PHA 25 Verified





Coated Tablet  10:00 


 


Admit ADMIT 25 Verified





  04:12 


 


Allergies JAQUELINE 25 Verified





  04:12 


 


Code Status CODE 25 Verified





  04:12 


 


Sodium Chloride Lock PHA 25 Verified





 (Saline Lock Ns)  06:00 








Problem List:  


(1) Acute chest pain


(2) Atrial fibrillation with RVR


(3) Elevated troponin


(4) Sepsis, unspecified organism


(5) Acute respiratory failure with hypoxia


(6) Elevated brain natriuretic peptide (BNP) level





Date of Service:  2025


Billing Provider:  REECE SPARKS DNP


Common Visit Codes:  99223-INITIAL  INP/OBS CARE (HIGH)











REECE SPARKS DNP            2025 04:20

## 2025-02-23 NOTE — DVHPN2
Progress Note - Dictate


Date Seen:  Feb 23, 2025


Medical Necessity Reason


Pt with a Central, PICC or Fol:  No


Subjective


Patient seen and examined at bedside.


Remains on supplemental oxygen


Overnight events reviewed.


vital signs





                                   Vital Sign








  Date Time  Temp Pulse Resp B/P (MAP) Pulse Ox O2 Delivery O2 Flow Rate FiO2


 


2/23/25 21:00 97.6 51 22 97/60 (72) 96   





 97.6       


 


2/23/25 19:39      Nasal Cannula* 6 44














                           Total Intake and Output   


 


 2/22/25 2/22/25 2/23/25





 15:00 23:00 07:00


 


Intake Total 60 ml 1450 ml 250 ml


 


Output Total  1450 ml 


 


Balance 60 ml 0 ml 250 ml








medications





                               Current Medications








 Medications  Dose


 Ordered  Sig/Hardy


 Route  Start Time


 Stop Time Status Last Admin


Dose Admin


 


 Ceftriaxone Sodium  50 ml @ 


 100 mls/hr  DAILY@09


 IV  2/22/25 09:00


    2/23/25 09:19


100 MLS/HR


 


 Vancomycin HCl  0 ml @ 0


 mls/hr  UD


 IV  2/22/25 04:15


     





 


 Carvedilol  12.5 mg  Q12HR


 PO  2/22/25 10:00


    2/23/25 10:39


12.5 MG


 


 Levalbuterol HCl  1.25 mg  Q6HR


 NEB  2/22/25 06:00


    2/23/25 20:12


1.25 MG


 


 Ipratropium


 Bromide  0.5 mg  Q4HPRN  PRN


 NEB  2/22/25 04:15


    2/23/25 20:12


0.5 MG


 


 Aspirin  81 mg  DAILY


 PO  2/23/25 10:00


    2/23/25 09:54


81 MG


 


 Sodium Chloride  10 ml  Q8HR


 IV  2/22/25 06:00


    2/23/25 14:00


10 ML


 


 Acetaminophen/


 Hydrocodone Bitart  1 tab  Q4HP  PRN


 PO  2/22/25 04:15


     





 


 Ondansetron HCl  4 mg  Q4HP  PRN


 IV  2/22/25 04:15


     





 


 Docusate Sodium  100 mg  BIDPRN  PRN


 PO  2/22/25 04:15


     





 


 Acetaminophen  650 mg  Q6HP  PRN


 PO  2/22/25 04:15


    2/23/25 01:06


650 MG


 


 Nitroglycerin  0.4 mg  Q5MINP  PRN


 SL  2/22/25 04:15


     





 


 Morphine Sulfate  2 mg  Q30M  PRN


 IV  2/22/25 04:15


     





 


 Enoxaparin Sodium  150 mg  Q12HR


 SC  2/22/25 10:00


    2/23/25 09:47


150 MG


 


 Furosemide  40 mg  DAILY


 IV  2/23/25 10:00


   UNV  





 


 Furosemide  40 mg  DAILY


 IV  2/23/25 10:00


    2/23/25 10:40


40 MG


 


 Vancomycin HCl  250 ml @ 


 250 mls/hr  Q8H


 IV  2/22/25 18:15


    2/23/25 18:37


250 MLS/HR








objective


Gen.: Patient lying in bed in no apparent distress. On supplemental oxygen.


Head: Normocephalic, atraumatic.


Eyes: EOMI/PERRLA.


Ears: Normal hearing. Normal anatomy.


Neck/trachea: Trachea midline, supple.


Nose: Normal external anatomy.


Mouth: Moist mucous membranes.


Chest: Decreased air entry bilaterally. No wheezing or rhonchi.


Cardiovascular: Positive S1, positive S2. Regular rate and rhythm.


Abdomen: Positive bowel sounds in all 4 quadrants. Soft, non-tender, non-

distended.


: Deferred.


Rectal: Deferred.


Skin: Warm, dry. Intact.


Extremities: 2+ radial pulses bilaterally. No lower extremity edema.


Neuro: Awake, alert, oriented x3. No gross motor or sensory deficits. Cranial 

nerves II through XII intact. Gait not assessed.


laboratory and microbiology


                                Laboratory Tests


2/23/25 05:58

















Test


 2/23/25


05:58 Range/Units


 


 


Serum Glucose 115 H   mg/dL








Assessment/Plan


Impression:


Acute hypoxic respiratory failure


CHF exacerbation


Dyspnea


Morbid obesity BMI 57.9


Elevated troponin





Events:


Remains on supplemental oxygen, 6 LPM NC


Taper O2 as tolerated





U/S venous Doppler of BLE showed no e/o DVT.





Continue bronchodilators


Continue antibiotics





Blood cultures show no growth x24 hours in one set; once it showed GPC in 

clusters.


WBC 11.2 K


Urine cultures show no growth.





Therapeutic Lovenox





Diurese w/ Lasix


Monitor renal function.


Monitor electrolytes. Supplement as necessary.





Labs and imaging reviewed.


Rest of plan as noted below.





Plan:


Supplemental O2


Titrate to keep O2 sats above 92%.





Continue antibiotics


Follow up cultures





F/u Cardiology recs


Elevated troponin


Follow up Echo report





Diurese w/ Lasix


Monitor renal function.


Monitor electrolytes. Supplement as necessary.


Monitor ins and outs.





Diet and lifestyle modifications for weight reduction


Morbid obesity - complicates all care





Recommend outpatient sleep study to r/o COLLEEN.





DVT prophylaxis.





Prognosis: Poor given patient's multiple co-morbidities.





Rest of plan per hospitalist and other consultants.





Thank you, ALPA Cancino, for allowing me to participate in this patient's care.


Further recommendations will depend on the patient's clinical course.


Please do not hesitate to contact me if you have any questions or concerns.





This medical document was created using an electronic medical record system with

Dragon computerized dictation system. Although these documentations are being 

carefully reviewed, there may still be some phonetic and typographical changes. 

The errors are purely typographical, due to imperfection on the software 

program, and do not reflect any compromise in the patient's medical care.


Plan discussed with:  Patient, Other (AD Ceja)











MARIANNA BARNETT MD             Feb 23, 2025 23:13

## 2025-02-23 NOTE — DVHPN2
Subjective


49-year-old male with a known history of morbid obesity class three initially 

presented to the hospital with increasing shortness a breath and chest pain 

found to have NSTEMI and new onset of AFib with RVR.  Today patient is off of 

BiPAP.  D-dimer is elevated CT angio has been ordered to rule out PE.  Patient 

is currently denies any chest pain and shortness of breaths.


Reviewed:  Care Plan


Changes from previous H/P or p:  No Changes


Eyes:  No Pain, No Vision change, No Conjunctivae inflammation, No Eyelid 

inflammation, No Other, No Redness


ENT:  No Ear pain, No Ear discharge, No Nose pain, No Nose discharge, No Nose 

congestion, No Mouth pain, No Mouth swelling, No Throat pain, No Throat 

swelling, No Other


Cardiovascular:  Chest Pain, Palpitations; No Orthopnea, No Paroxysmal Noc. 

Dyspnea, No Edema, No Lt Headedness; Other (Dizzy spells, left arm pain, 

diaphoresis.)


Respiratory:  No Cough, No Dry; Shortness of breath, SOB with excertion; No 

Wheezing, No Hemoptysis, No Pleuritic Pain, No Sputum; Other (SOB at rest)


Gastrointestinal:  No Nausea, No Vomiting, No Abdominal Pain, No Diarrhea, No 

Constipation, No Melena, No Hematochezia, No Other


Genitourinary:  No Dysuria, No Frequency, No Incontinence, No Hematuria, No 

Retention, No Other


Musculoskeletal:  No other, No neck pain, No shoulder pain, No arm pain, No back

pain, No hand pain, No leg pain, No foot pain


Skin:  No Rash, No Lesions, No Jaundice, No Bruising, No Other





Objective


Vitals





Vital Signs








  Date Time  Temp Pulse Resp B/P (MAP) Pulse Ox O2 Delivery O2 Flow Rate FiO2


 


2/23/25 12:27  78 22 110/64 (79) 94   


 


2/23/25 12:01      Nasal Cannula* 6 44


 


2/23/25 08:05 97.5       





 97.5       








Intake/Output











                               Intake and Output 


 


 2/23/25





 07:00


 


Intake Total 1760 ml


 


Output Total 1450 ml


 


Balance 310 ml


 


 


 


Intake Oral 1200 ml


 


IV Total 560 ml


 


Output Urine Total 1450 ml








Exam


HEENT pupils are reactive 


Neck is supple 


CV is S1-S2 regular rate and rhythm 


Respiratory diminished breath sounds bases


GI positive bowel sound


Extremity no edema 


CNS no motor deficit


Medications





                               Current Medications








 Medications  Dose


 Ordered  Sig/Hardy


 Route  Start Time


 Stop Time Status Last Admin


Dose Admin


 


 Ceftriaxone Sodium  50 ml @ 


 100 mls/hr  DAILY@09


 IV  2/22/25 09:00


    2/23/25 09:19


100 MLS/HR


 


 Vancomycin HCl  0 ml @ 0


 mls/hr  UD


 IV  2/22/25 04:15


     





 


 Carvedilol  12.5 mg  Q12HR


 PO  2/22/25 10:00


    2/23/25 10:39


12.5 MG


 


 Levalbuterol HCl  1.25 mg  Q6HR


 NEB  2/22/25 06:00


    2/23/25 12:00


1.25 MG


 


 Ipratropium


 Bromide  0.5 mg  Q4HPRN  PRN


 NEB  2/22/25 04:15


    2/23/25 12:00


0.5 MG


 


 Aspirin  81 mg  DAILY


 PO  2/23/25 10:00


    2/23/25 09:54


81 MG


 


 Sodium Chloride  10 ml  Q8HR


 IV  2/22/25 06:00


    2/23/25 14:00


10 ML


 


 Acetaminophen/


 Hydrocodone Bitart  1 tab  Q4HP  PRN


 PO  2/22/25 04:15


     





 


 Ondansetron HCl  4 mg  Q4HP  PRN


 IV  2/22/25 04:15


     





 


 Docusate Sodium  100 mg  BIDPRN  PRN


 PO  2/22/25 04:15


     





 


 Acetaminophen  650 mg  Q6HP  PRN


 PO  2/22/25 04:15


    2/23/25 01:06


650 MG


 


 Nitroglycerin  0.4 mg  Q5MINP  PRN


 SL  2/22/25 04:15


     





 


 Morphine Sulfate  2 mg  Q30M  PRN


 IV  2/22/25 04:15


     





 


 Enoxaparin Sodium  150 mg  Q12HR


 SC  2/22/25 10:00


    2/23/25 09:47


150 MG


 


 Furosemide  40 mg  DAILY


 IV  2/23/25 10:00


   UNV  





 


 Furosemide  40 mg  DAILY


 IV  2/23/25 10:00


    2/23/25 10:40


40 MG


 


 Vancomycin HCl  250 ml @ 


 250 mls/hr  Q8H


 IV  2/22/25 18:15


    2/23/25 10:07


250 MLS/HR











Laboratory Results


Laboratory Tests


2/23/25 05:58











Chemistry








Test


 2/23/25


05:58


 


Albumin


 3.8 g/dL


(3.2-4.8)


 


Calcium Level


 8.9 mg/dL


(8.7-10.4)


 


Total Protein


 7.6 g/dL


(5.7-8.2)








LFT








Test


 2/23/25


05:58


 


Alanine Aminotransferase (ALT) 28 U/L (7-40)  


 


Alkaline Phosphatase


 51 U/L


()


 


Aspartate Amino Transferase


(AST) 28 U/L (13-40)





 


Total Bilirubin


 1.4 mg/dL


(0.2-1.0)  H








Microbiology





                                  Microbiology








 Date/Time


Source Procedure


Growth Status





 


 2/22/25 16:00


Urine - [place] Urine Culture - Preliminary


 Resulted


 


 2/22/25 15:00


Nose MRSA Screen - Final


 Complete


 


 2/21/25 22:29


Blood Blood Culture - Preliminary


 Resulted











Assessment/Plan


Assessment/Plan





49-year-old male with a known history of morbid obesity class three, 

hypertension initially presented to the hospital with the increasing shortness a

breath as well as chest pain found to have 


1. Acute hypoxic respiratory failure requiring BiPAP support currently off of 

BiPAP 


2. Elevated D-dimers rule out pulmonary embolism


3. Elevated troponin/NSTEMI


4. New onset of AFib with RVR currently rate controlled


5. Cough and flu-like symptoms


6. Morbid obesity class three


-CT angio to rule out PE, therapeutic Lovenox, risks benefits and alternatives 

of therapeutic Lovenox including life-threatening bleeding disability death 

explained to the patient in detail who understand verbalized understanding and 

agreeable-to plan 


Follow up 2D echo, cardiology consultation for any ischemic workup


Plan discussed with:  Patient, Other


My Orders





                           Orders - VIVEK LEE MD








Procedure Category Date Status





  Time 


 


Ct Angio Chest CT 2/23/25 Logged





Contrast  16:36 


 


Blood Culture PRO 2/23/25 Transmitted





  16:37 











Date of Service:  Feb 23, 2025


Billing Provider:  VIVEK LEE MD


Common Visit Codes:  [id number] INP/OBS CARE(HIGH)











VIVEK LEE MD             Feb 23, 2025 16:42

## 2025-02-23 NOTE — DVHPN2
Progress Note


Date Seen:  Feb 23, 2025


Medical Necessity Reason


Pt with a Central, PICC or Fol:  No





Subjective


Patient reports:  Feels better





Objective


vital signs





                                   Vital Sign








  Date Time  Temp Pulse Resp B/P (MAP) Pulse Ox O2 Delivery O2 Flow Rate FiO2


 


2/23/25 08:05 97.5 101 19 95/62 (73) 93   





 97.5       


 


2/23/25 07:30      Nasal Cannula* 6 44














                           Total Intake and Output   


 


 2/22/25 2/22/25 2/23/25





 15:00 23:00 07:00


 


Intake Total 60 ml 1450 ml 250 ml


 


Output Total  1450 ml 


 


Balance 60 ml 0 ml 250 ml








medications





                               Current Medications








 Medications  Dose


 Ordered  Sig/Hardy


 Route  Start Time


 Stop Time Status Last Admin


Dose Admin


 


 Ceftriaxone Sodium  50 ml @ 


 100 mls/hr  DAILY@09


 IV  2/22/25 09:00


    2/22/25 08:36


100 MLS/HR


 


 Vancomycin HCl  0 ml @ 0


 mls/hr  UD


 IV  2/22/25 04:15


     





 


 Carvedilol  12.5 mg  Q12HR


 PO  2/22/25 10:00


    2/22/25 21:52


12.5 MG


 


 Levalbuterol HCl  1.25 mg  Q6HR


 NEB  2/22/25 06:00


    2/23/25 05:37


1.25 MG


 


 Ipratropium


 Bromide  0.5 mg  Q4HPRN  PRN


 NEB  2/22/25 04:15


    2/23/25 05:37


0.5 MG


 


 Aspirin  81 mg  DAILY


 PO  2/23/25 10:00


     





 


 Sodium Chloride  10 ml  Q8HR


 IV  2/22/25 06:00


    2/23/25 06:04


10 ML


 


 Acetaminophen/


 Hydrocodone Bitart  1 tab  Q4HP  PRN


 PO  2/22/25 04:15


     





 


 Ondansetron HCl  4 mg  Q4HP  PRN


 IV  2/22/25 04:15


     





 


 Docusate Sodium  100 mg  BIDPRN  PRN


 PO  2/22/25 04:15


     





 


 Acetaminophen  650 mg  Q6HP  PRN


 PO  2/22/25 04:15


    2/23/25 01:06


650 MG


 


 Nitroglycerin  0.4 mg  Q5MINP  PRN


 SL  2/22/25 04:15


     





 


 Morphine Sulfate  2 mg  Q30M  PRN


 IV  2/22/25 04:15


     





 


 Enoxaparin Sodium  150 mg  Q12HR


 SC  2/22/25 10:00


    2/22/25 21:52


150 MG


 


 Furosemide  40 mg  DAILY


 IV  2/23/25 10:00


   UNV  





 


 Furosemide  40 mg  DAILY


 IV  2/23/25 10:00


     





 


 Vancomycin HCl  250 ml @ 


 250 mls/hr  Q8H


 IV  2/22/25 18:15


    2/23/25 02:09


250 MLS/HR








Examination:  GENERAL:Abnormal, HEENT:Abnormal, LUNGS:Abnormal, CVS:Abnormal, 

ABDOMEN:Abnormal


laboratory and microbiology


                                Laboratory Tests


2/23/25 05:58

















Test


 2/23/25


05:58 Range/Units


 


 


Serum Glucose 115 H   mg/dL








                                  Microbiology








 Date/Time


Source Procedure


Growth Status





 


 2/21/25 22:29


Blood Blood Culture - Preliminary


 Resulted











Problem List/Assessment/Plan


Problem List/Assessment/Plan


afib 


obesity morbid


elevated d dimer


nstemi








cont lovenox


no DVT


defer d dimer to primary team


HR low 100s cont coreg


advanced HF on echo---start GDMT,


Plan discussed with:  Patient





Date of Service:  Feb 23, 2025


Billing Provider:  ALISSA MAR MD


Common Visit Codes:  NOT BILLABLE











ALISSA MAR MD              Feb 23, 2025 08:24

## 2025-02-23 NOTE — DVH
CTA Chest with  intravenous contrast



INDICATION: Elevated D-dimer rule out pulmonary embolism



Comparison Study: None available at time of dictation.



TECHNIQUE: Multidetector spiral CTA of the chest was performed of the chest with intravenous contrast
. PULMONARY ANGIOGRAPHY PROTOCOL was utilized using a bolus-tracking technique centered on the main p
ulmonary artery. Axial, coronal and sagittal multiplanar and MIP reformats were performed.



Radiation Dose : 



1. Chest:        CTDI volume is 28.67 mGy. Dose-length product is 1154.05 mGy*cm



The dose indicators for CT are the volume Computed Tomography (CT) Dose Index (CTDIvol) and the Dose 
Length Product (DLP), and are measured in units of mGy and mGy-cm, respectively. These indicators are
 not patient dose, but values generated from the CT scanner acquisition factors. The report includes 
radiation exposure data for exposures received during this examination.  



Findings: 



Pulmonary artery: 



Nondiagnostic evaluation due to contrast bolus timing. There is dilation of the pulmonary trunk measu
ring 5 cm with associated pulmonary vascular congestion.



Lower neck:  Within normal limits.



Lungs:  Ground-glass opacities in the anterior right upper lobe..



Heart/Vascular Structures:  Mild cardiomegaly..



Lymph Nodes: No adenopathy



Pleura:  Within normal limits.



Musculoskeletal:  Within normal limits.



Body wall:  Within normal limits.



Upper abdomen:  cholelithiasis



IMPRESSION: 



Nondiagnostic evaluation for pulmonary embolism.  If there is high clinical concern consider repeat e
valuation or follow-up with nuclear medicine ventilation perfusion scan.



Findings suggestive of pulmonary hypertension and pulmonary vascular congestion.



Ground-glass opacities in the right anterior upper lobe which are nonspecific but can be seen with de
veloping infectious process versus underlying lesions versus pulmonary edema.



Cholelithiasis



Electronically Signed by: José Miguel Cloud at 02/23/2025 22:22:24 PM

## 2025-02-24 NOTE — ECG
[facility]

                                       

Test Date:    2025               Test Time:    21:49:17

Pat Name:     JOSE PAYNE            Department:   er

Patient ID:   [id number]           Room:         0294T B

Gender:       M                        Technician:   nimesh

:          1975               Requested By: EMERGENCY EMERGENCY

Order Number: [id number]        Reading MD:   Agustin Ramírez

                                 Measurements

Intervals                              Axis          

Rate:         153                      P:            0

KS:           0                        QRS:          -68

QRSD:         106                      T:            196

QT:           294                                    

QTc:          470                                    

                           Interpretive Statements

Atrial fibrillation

Ventricular tachycardia, unsustained

Incomplete RBBB and LAFB

Low voltage, precordial leads

RVH with secondary repolarization abnrm

Abnormal T, consider ischemia, lateral leads

Artifact in lead(s) I,II,III,aVR,aVL,aVF,V2,V4,V5,V6 and baseline wander in

lead(s) I,III,aVL,aVF

Electronically Signed On 2025 21:54:41 PST by Agustin Ramírez



Please click the below link to view image of tracing.

## 2025-02-24 NOTE — DVHPN2
Progress Note - Dictate


Date Seen:  Feb 24, 2025


Medical Necessity Reason


Pt with a Central, PICC or Fol:  Yes


The following are medically ne:  Cuevas Catheter


Reason for cuevas catheter:  Strict I&O


Subjective


Patient seen and examined at bedside.


Remains on supplemental oxygen


Overnight events reviewed.


vital signs





                                   Vital Sign








  Date Time  Temp Pulse Resp B/P (MAP) Pulse Ox O2 Delivery O2 Flow Rate FiO2


 


2/24/25 22:00  72  114/88    


 


2/24/25 21:00 98.0  23  97   





 98.0       


 


2/24/25 20:09       6.0 44


 


2/24/25 11:59      Nasal Cannula*  














                           Total Intake and Output   


 


 2/23/25 2/23/25 2/24/25





 15:00 23:00 07:00


 


Intake Total 300 ml  900 ml


 


Output Total   800 ml


 


Balance 300 ml  100 ml








medications





                               Current Medications








 Medications  Dose


 Ordered  Sig/Hardy


 Route  Start Time


 Stop Time Status Last Admin


Dose Admin


 


 Ceftriaxone Sodium  50 ml @ 


 100 mls/hr  DAILY@09


 IV  2/22/25 09:00


    2/24/25 10:07


100 MLS/HR


 


 Vancomycin HCl  0 ml @ 0


 mls/hr  UD


 IV  2/22/25 04:15


     





 


 Carvedilol  12.5 mg  Q12HR


 PO  2/22/25 10:00


    2/24/25 22:00


12.5 MG


 


 Levalbuterol HCl  1.25 mg  Q6HR


 NEB  2/22/25 06:00


    2/24/25 19:13


1.25 MG


 


 Ipratropium


 Bromide  0.5 mg  Q4HPRN  PRN


 NEB  2/22/25 04:15


    2/24/25 19:13


0.5 MG


 


 Aspirin  81 mg  DAILY


 PO  2/23/25 10:00


    2/24/25 10:12


81 MG


 


 Sodium Chloride  10 ml  Q8HR


 IV  2/22/25 06:00


    2/24/25 22:01


10 ML


 


 Acetaminophen/


 Hydrocodone Bitart  1 tab  Q4HP  PRN


 PO  2/22/25 04:15


     





 


 Ondansetron HCl  4 mg  Q4HP  PRN


 IV  2/22/25 04:15


     





 


 Docusate Sodium  100 mg  BIDPRN  PRN


 PO  2/22/25 04:15


     





 


 Acetaminophen  650 mg  Q6HP  PRN


 PO  2/22/25 04:15


    2/23/25 01:06


650 MG


 


 Nitroglycerin  0.4 mg  Q5MINP  PRN


 SL  2/22/25 04:15


     





 


 Morphine Sulfate  2 mg  Q30M  PRN


 IV  2/22/25 04:15


     





 


 Enoxaparin Sodium  150 mg  Q12HR


 SC  2/22/25 10:00


    2/23/25 09:47


150 MG


 


 Furosemide  40 mg  DAILY


 IV  2/23/25 10:00


   UNV  





 


 Furosemide  40 mg  DAILY


 IV  2/23/25 10:00


    2/24/25 10:21


40 MG


 


 Vancomycin HCl  250 ml @ 


 250 mls/hr  Q8H


 IV  2/22/25 18:15


    2/24/25 18:55


250 MLS/HR








objective


Gen.: Patient lying in bed in no apparent distress. On supplemental oxygen.


Head: Normocephalic, atraumatic.


Eyes: EOMI/PERRLA.


Ears: Normal hearing. Normal anatomy.


Neck/trachea: Trachea midline, supple.


Nose: Normal external anatomy.


Mouth: Moist mucous membranes.


Chest: Decreased air entry bilaterally. No wheezing or rhonchi.


Cardiovascular: Positive S1, positive S2. Regular rate and rhythm.


Abdomen: Positive bowel sounds in all 4 quadrants. Soft, non-tender, non-

distended.


: Deferred.


Rectal: Deferred.


Skin: Warm, dry. Intact.


Extremities: 2+ radial pulses bilaterally. No lower extremity edema.


Neuro: Awake, alert, oriented x3. No gross motor or sensory deficits. Cranial 

nerves II through XII intact. Gait not assessed.


laboratory and microbiology


                                Laboratory Tests


2/24/25 07:21








2/23/25 05:58

















Test


 2/24/25


07:21 Range/Units


 


 


Serum Glucose 106    mg/dL








Assessment/Plan


Impression:


Acute hypoxic respiratory failure


CHF exacerbation


Dyspnea


Morbid obesity BMI 57.9


Elevated troponin





Events:


Remains on supplemental oxygen, 6 LPM NC


Taper O2 as tolerated





Continue bronchodilators


Continue antibiotics


Blood cultures show no growth after 48 hours in one set; one set notable for GPC

in clusters.


Urine cultures show no growth.





Therapeutic Lovenox





Continue diuresis w/ Lasix


Monitor renal function.


Monitor electrolytes. Supplement as necessary.





Disposition per hospitalist.





Labs and imaging reviewed.


Rest of plan as noted below.





Plan:


Supplemental O2


Titrate to keep O2 sats above 92%.





Continue antibiotics


Follow up cultures





F/u Cardiology recs


Elevated troponin


Follow up Echo report





Diurese w/ Lasix


Monitor renal function.


Monitor electrolytes. Supplement as necessary.


Monitor ins and outs.





Diet and lifestyle modifications for weight reduction


Morbid obesity - complicates all care





Recommend outpatient sleep study to r/o COLLEEN.





DVT prophylaxis.





Prognosis: Poor given patient's multiple co-morbidities.





Rest of plan per hospitalist and other consultants.





Thank you, ALPA Cancino, for allowing me to participate in this patient's care.


Further recommendations will depend on the patient's clinical course.


Please do not hesitate to contact me if you have any questions or concerns.





This medical document was created using an electronic medical record system with

Dragon computerized dictation system. Although these documentations are being 

carefully reviewed, there may still be some phonetic and typographical changes. 

The errors are purely typographical, due to imperfection on the software 

program, and do not reflect any compromise in the patient's medical care.


Plan discussed with:  Patient, Other (AD Griffiths)











MARIANNA BARNETT MD             Feb 24, 2025 22:57

## 2025-02-24 NOTE — DVHPN2
Subjective


49-year-old male with a known history of morbid obesity class three initially 

presented to the hospital with increasing shortness a breath and chest pain 

found to have NSTEMI and new onset of AFib with RVR.  Today patient is off of 

BiPAP.  D-dimer is elevated CT angio has been ordered to rule out PE.  Patient 

is currently denies any chest pain and shortness of breaths.


Reviewed:  Care Plan


Changes from previous H/P or p:  No Changes


Eyes:  No Pain, No Vision change, No Conjunctivae inflammation, No Eyelid 

inflammation, No Other, No Redness


ENT:  No Ear pain, No Ear discharge, No Nose pain, No Nose discharge, No Nose 

congestion, No Mouth pain, No Mouth swelling, No Throat pain, No Throat 

swelling, No Other


Cardiovascular:  Chest Pain, Palpitations; No Orthopnea, No Paroxysmal Noc. 

Dyspnea, No Edema, No Lt Headedness; Other (Dizzy spells, left arm pain, 

diaphoresis.)


Respiratory:  No Cough, No Dry; Shortness of breath, SOB with excertion; No 

Wheezing, No Hemoptysis, No Pleuritic Pain, No Sputum; Other (SOB at rest)


Gastrointestinal:  No Nausea, No Vomiting, No Abdominal Pain, No Diarrhea, No 

Constipation, No Melena, No Hematochezia, No Other


Genitourinary:  No Dysuria, No Frequency, No Incontinence, No Hematuria, No 

Retention, No Other


Musculoskeletal:  No other, No neck pain, No shoulder pain, No arm pain, No back

pain, No hand pain, No leg pain, No foot pain


Skin:  No Rash, No Lesions, No Jaundice, No Bruising, No Other





Objective


Vitals





Vital Signs








  Date Time  Temp Pulse Resp B/P (MAP) Pulse Ox O2 Delivery O2 Flow Rate FiO2


 


2/24/25 12:05  58 20  97   


 


2/24/25 11:59      Nasal Cannula* 6 44


 


2/24/25 10:21    110/80    


 


2/24/25 08:00 97.3       





 97.3       








Intake/Output











                               Intake and Output 


 


 2/24/25





 07:00


 


Intake Total 1200 ml


 


Output Total 800 ml


 


Balance 400 ml


 


 


 


Intake Oral 900 ml


 


IV Total 300 ml


 


Output Urine Total 800 ml








Exam


HEENT pupils are reactive 


Neck is supple 


CV is S1-S2 regular rate and rhythm 


Respiratory diminished breath sounds bases


GI positive bowel sound


Extremity no edema 


CNS no motor deficit


Medications





                               Current Medications








 Medications  Dose


 Ordered  Sig/Hardy


 Route  Start Time


 Stop Time Status Last Admin


Dose Admin


 


 Ceftriaxone Sodium  50 ml @ 


 100 mls/hr  DAILY@09


 IV  2/22/25 09:00


    2/24/25 10:07


100 MLS/HR


 


 Vancomycin HCl  0 ml @ 0


 mls/hr  UD


 IV  2/22/25 04:15


     





 


 Carvedilol  12.5 mg  Q12HR


 PO  2/22/25 10:00


    2/24/25 10:13


12.5 MG


 


 Levalbuterol HCl  1.25 mg  Q6HR


 NEB  2/22/25 06:00


    2/24/25 11:59


1.25 MG


 


 Ipratropium


 Bromide  0.5 mg  Q4HPRN  PRN


 NEB  2/22/25 04:15


    2/24/25 07:26


0.5 MG


 


 Aspirin  81 mg  DAILY


 PO  2/23/25 10:00


    2/24/25 10:12


81 MG


 


 Sodium Chloride  10 ml  Q8HR


 IV  2/22/25 06:00


    2/24/25 13:48


10 ML


 


 Acetaminophen/


 Hydrocodone Bitart  1 tab  Q4HP  PRN


 PO  2/22/25 04:15


     





 


 Ondansetron HCl  4 mg  Q4HP  PRN


 IV  2/22/25 04:15


     





 


 Docusate Sodium  100 mg  BIDPRN  PRN


 PO  2/22/25 04:15


     





 


 Acetaminophen  650 mg  Q6HP  PRN


 PO  2/22/25 04:15


    2/23/25 01:06


650 MG


 


 Nitroglycerin  0.4 mg  Q5MINP  PRN


 SL  2/22/25 04:15


     





 


 Morphine Sulfate  2 mg  Q30M  PRN


 IV  2/22/25 04:15


     





 


 Enoxaparin Sodium  150 mg  Q12HR


 SC  2/22/25 10:00


    2/23/25 09:47


150 MG


 


 Furosemide  40 mg  DAILY


 IV  2/23/25 10:00


   UNV  





 


 Furosemide  40 mg  DAILY


 IV  2/23/25 10:00


    2/24/25 10:21


40 MG


 


 Vancomycin HCl  250 ml @ 


 250 mls/hr  Q8H


 IV  2/22/25 18:15


    2/24/25 12:16


250 MLS/HR











Laboratory Results


Laboratory Tests


2/23/25 05:58








2/24/25 07:21











Chemistry








Test


 2/24/25


07:21


 


Calcium Level


 9.0 mg/dL


(8.7-10.4)








Microbiology





                                  Microbiology








 Date/Time


Source Procedure


Growth Status





 


 2/22/25 16:00


Urine - [place] Urine Culture - Preliminary


 Resulted


 


 2/22/25 15:00


Nose MRSA Screen - Final


 Complete


 


 2/21/25 22:29


Blood Blood Culture - Preliminary


 Resulted











Assessment/Plan


Assessment/Plan





49-year-old male with a known history of morbid obesity class three, 

hypertension initially presented to the hospital with the increasing shortness a

breath as well as chest pain found to have 


1. Acute hypoxic respiratory failure requiring BiPAP support currently off of 

BiPAP , on nasal cannula


2. Elevated D-dimers ruled out pulmonary embolism


3. Elevated troponin/NSTEMI


4. New onset of AFib with RVR currently rate controlled


5. Cough and flu-like symptoms with possible pneumonia


6. Morbid obesity classIII


7. Gram-positive bacteremia 1/2


-repeat blood cultures, continue vancomycin per pharmacy


-infectious disease consultation therapeutic Lovenox, risks benefits and 

alternatives of therapeutic Lovenox including life-threatening bleeding 

disability death explained to the patient in detail who understand verbalized 

understanding and agreeable-to plan 


Follow up 2D echo, cardiology consultation for any ischemic workup


Plan discussed with:  Patient


My Orders





                           Orders - VIVEK LEE MD








Procedure Category Date Status





  Time 


 


Ct Angio Chest CT 2/23/25 Resulted





Contrast  16:36 


 


Blood Culture PRO 2/23/25 In Process





  16:37 











Date of Service:  Feb 24, 2025


Billing Provider:  VIVEK LEE MD


Common Visit Codes:  [id number] INP/OBS CARE(MOD)











VIVEK LEE MD             Feb 24, 2025 16:15

## 2025-02-25 NOTE — DVHPN2
Progress Note - Dictate


Date Seen:  Feb 25, 2025


Medical Necessity Reason


Pt with a Central, PICC or Fol:  Yes


The following are medically ne:  Cuevas Catheter


Reason for cuevas catheter:  Strict I&O


Subjective


Patient seen and examined at bedside.


Remains on supplemental oxygen


Overnight events reviewed.


vital signs





                                   Vital Sign








  Date Time  Temp Pulse Resp B/P (MAP) Pulse Ox O2 Delivery O2 Flow Rate FiO2


 


2/25/25 21:34  109  118/76    


 


2/25/25 20:46 98.1  17  65   





 98.1       


 


2/25/25 19:03      Nasal Cannula* 6 44














                           Total Intake and Output   


 


 2/24/25 2/24/25 2/25/25





 15:00 23:00 07:00


 


Intake Total 300 ml 625 ml 2800 ml


 


Output Total  2350 ml 1250 ml


 


Balance 300 ml -1725 ml 1550 ml








medications





                               Current Medications








 Medications  Dose


 Ordered  Sig/Hardy


 Route  Start Time


 Stop Time Status Last Admin


Dose Admin


 


 Ceftriaxone Sodium  50 ml @ 


 100 mls/hr  DAILY@09


 IV  2/22/25 09:00


    2/25/25 08:56


100 MLS/HR


 


 Vancomycin HCl  0 ml @ 0


 mls/hr  UD


 IV  2/22/25 04:15


     





 


 Carvedilol  12.5 mg  Q12HR


 PO  2/22/25 10:00


    2/25/25 21:34


12.5 MG


 


 Levalbuterol HCl  1.25 mg  Q6HR


 NEB  2/22/25 06:00


    2/25/25 19:03


1.25 MG


 


 Ipratropium


 Bromide  0.5 mg  Q4HPRN  PRN


 NEB  2/22/25 04:15


    2/25/25 19:03


0.5 MG


 


 Aspirin  81 mg  DAILY


 PO  2/23/25 10:00


    2/25/25 08:56


81 MG


 


 Sodium Chloride  10 ml  Q8HR


 IV  2/22/25 06:00


    2/25/25 21:38


10 ML


 


 Acetaminophen/


 Hydrocodone Bitart  1 tab  Q4HP  PRN


 PO  2/22/25 04:15


    2/25/25 21:34


1 TAB


 


 Ondansetron HCl  4 mg  Q4HP  PRN


 IV  2/22/25 04:15


     





 


 Docusate Sodium  100 mg  BIDPRN  PRN


 PO  2/22/25 04:15


     





 


 Acetaminophen  650 mg  Q6HP  PRN


 PO  2/22/25 04:15


    2/23/25 01:06


650 MG


 


 Nitroglycerin  0.4 mg  Q5MINP  PRN


 SL  2/22/25 04:15


     





 


 Morphine Sulfate  2 mg  Q30M  PRN


 IV  2/22/25 04:15


     





 


 Enoxaparin Sodium  150 mg  Q12HR


 SC  2/22/25 10:00


    2/25/25 22:14


150 MG


 


 Furosemide  40 mg  DAILY


 IV  2/23/25 10:00


   UNV  





 


 Furosemide  40 mg  DAILY


 IV  2/23/25 10:00


    2/25/25 08:57


40 MG


 


 Vancomycin HCl  250 ml @ 


 250 mls/hr  Q12H


 IV  2/25/25 20:00


    2/25/25 20:18


250 MLS/HR


 


 Melatonin  5 mg  HS  PRN


 PO  2/25/25 22:15


     





 


 Guaifenesin  200 mg  Q8HP  PRN


 PO  2/25/25 22:15


     











objective


Gen.: Patient lying in bed in no apparent distress. On supplemental oxygen.


Head: Normocephalic, atraumatic.


Eyes: EOMI/PERRLA.


Ears: Normal hearing. Normal anatomy.


Neck/trachea: Trachea midline, supple.


Nose: Normal external anatomy.


Mouth: Moist mucous membranes.


Chest: Decreased air entry bilaterally. No wheezing or rhonchi.


Cardiovascular: Positive S1, positive S2. Regular rate and rhythm.


Abdomen: Positive bowel sounds in all 4 quadrants. Soft, non-tender, non-

distended.


: Deferred.


Rectal: Deferred.


Skin: Warm, dry. Intact.


Extremities: 2+ radial pulses bilaterally. No lower extremity edema.


Neuro: Awake, alert, oriented x3. No gross motor or sensory deficits. Cranial 

nerves II through XII intact. Gait not assessed.


laboratory and microbiology


                                Laboratory Tests


2/25/25 06:11

















Test


 2/25/25


06:11 Range/Units


 


 


Serum Glucose 96    mg/dL








Assessment/Plan


Impression:


Acute hypoxic respiratory failure


CHF exacerbation


Dyspnea


Morbid obesity BMI 57.9


Elevated troponin





Events:


Remains on supplemental oxygen, 6 LPM NC


Taper O2 as tolerated





Continue bronchodilators


Continue antibiotics


WBC is within normal limits.





Blood cultures show no growth after 72 hours in one set; one set notable for GPC

in clusters.





Repeat blood cultures show no growth after 24 hours


Urine cultures show no growth after 48 hours.





Therapeutic Lovenox





PT evaluation





Continue diuresis w/ Lasix


Monitor renal function.


Monitor electrolytes. Supplement as necessary.





Disposition per hospitalist.





Labs and imaging reviewed.


Rest of plan as noted below.





Plan:


Supplemental O2


Titrate to keep O2 sats above 92%.





Continue antibiotics


Follow up cultures





F/u Cardiology recs


Elevated troponin


Follow up Echo report





Susie leyva/ Lasix


Monitor renal function.


Monitor electrolytes. Supplement as necessary.


Monitor ins and outs.





Diet and lifestyle modifications for weight reduction


Morbid obesity - complicates all care





Recommend outpatient sleep study to r/o COLLEEN.





DVT prophylaxis.





Prognosis: Poor given patient's multiple co-morbidities.





Rest of plan per hospitalist and other consultants.





Thank you, [facility]PA Cancino, for allowing me to participate in this patient's care.


Further recommendations will depend on the patient's clinical course.


Please do not hesitate to contact me if you have any questions or concerns.





This medical document was created using an electronic medical record system with

Dragon computerized dictation system. Although these documentations are being 

carefully reviewed, there may still be some phonetic and typographical changes. 

The errors are purely typographical, due to imperfection on the software 

program, and do not reflect any compromise in the patient's medical care.


Plan discussed with:  Patient, Other (AD Cruz)











MARIANNA BARNETT MD             Feb 25, 2025 22:43

## 2025-02-25 NOTE — DVHPN2
Subjective


49-year-old male with a known history of morbid obesity class three initially 

presented to the hospital with increasing shortness a breath and chest pain 

found to have NSTEMI and new onset of AFib with RVR.  Today patient is off of 

BiPAP.  D-dimer is elevated, CT angio is negative for PE.  Patient was currently

on 6 L of oxygen via nasal cannula


Reviewed:  Care Plan


Changes from previous H/P or p:  No Changes


Eyes:  No Pain, No Vision change, No Conjunctivae inflammation, No Eyelid 

inflammation, No Other, No Redness


ENT:  No Ear pain, No Ear discharge, No Nose pain, No Nose discharge, No Nose 

congestion, No Mouth pain, No Mouth swelling, No Throat pain, No Throat 

swelling, No Other


Cardiovascular:  Chest Pain, Palpitations; No Orthopnea, No Paroxysmal Noc. 

Dyspnea, No Edema, No Lt Headedness; Other (Dizzy spells, left arm pain, 

diaphoresis.)


Respiratory:  No Cough, No Dry; Shortness of breath, SOB with excertion; No 

Wheezing, No Hemoptysis, No Pleuritic Pain, No Sputum; Other (SOB at rest)


Gastrointestinal:  No Nausea, No Vomiting, No Abdominal Pain, No Diarrhea, No 

Constipation, No Melena, No Hematochezia, No Other


Genitourinary:  No Dysuria, No Frequency, No Incontinence, No Hematuria, No 

Retention, No Other


Musculoskeletal:  No other, No neck pain, No shoulder pain, No arm pain, No back

pain, No hand pain, No leg pain, No foot pain


Skin:  No Rash, No Lesions, No Jaundice, No Bruising, No Other





Objective


Vitals





Vital Signs








  Date Time  Temp Pulse Resp B/P (MAP) Pulse Ox O2 Delivery O2 Flow Rate FiO2


 


2/25/25 12:54 98.6 78 18 119/88 (98) 95   





 98.6       


 


2/25/25 11:43      Nasal Cannula* 6 44








Intake/Output











                               Intake and Output 


 


 2/25/25





 07:00


 


Intake Total 3725 ml


 


Output Total 3600 ml


 


Balance 125 ml


 


 


 


Intake Oral 3300 ml


 


IV Total 425 ml


 


Output Urine Total 3600 ml








Exam


HEENT pupils are reactive 


Neck is supple 


CV is S1-S2 regular rate and rhythm 


Respiratory diminished breath sounds bases


GI positive bowel sound


Extremity no edema 


CNS no motor deficit


Medications





                               Current Medications








 Medications  Dose


 Ordered  Sig/Hardy


 Route  Start Time


 Stop Time Status Last Admin


Dose Admin


 


 Ceftriaxone Sodium  50 ml @ 


 100 mls/hr  DAILY@09


 IV  2/22/25 09:00


    2/25/25 08:56


100 MLS/HR


 


 Vancomycin HCl  0 ml @ 0


 mls/hr  UD


 IV  2/22/25 04:15


     





 


 Carvedilol  12.5 mg  Q12HR


 PO  2/22/25 10:00


    2/25/25 08:57


12.5 MG


 


 Levalbuterol HCl  1.25 mg  Q6HR


 NEB  2/22/25 06:00


    2/25/25 07:00


1.25 MG


 


 Ipratropium


 Bromide  0.5 mg  Q4HPRN  PRN


 NEB  2/22/25 04:15


    2/25/25 07:01


0.5 MG


 


 Aspirin  81 mg  DAILY


 PO  2/23/25 10:00


    2/25/25 08:56


81 MG


 


 Sodium Chloride  10 ml  Q8HR


 IV  2/22/25 06:00


    2/25/25 14:00


10 ML


 


 Acetaminophen/


 Hydrocodone Bitart  1 tab  Q4HP  PRN


 PO  2/22/25 04:15


     





 


 Ondansetron HCl  4 mg  Q4HP  PRN


 IV  2/22/25 04:15


     





 


 Docusate Sodium  100 mg  BIDPRN  PRN


 PO  2/22/25 04:15


     





 


 Acetaminophen  650 mg  Q6HP  PRN


 PO  2/22/25 04:15


    2/23/25 01:06


650 MG


 


 Nitroglycerin  0.4 mg  Q5MINP  PRN


 SL  2/22/25 04:15


     





 


 Morphine Sulfate  2 mg  Q30M  PRN


 IV  2/22/25 04:15


     





 


 Enoxaparin Sodium  150 mg  Q12HR


 SC  2/22/25 10:00


    2/25/25 08:56


150 MG


 


 Furosemide  40 mg  DAILY


 IV  2/23/25 10:00


   UNV  





 


 Furosemide  40 mg  DAILY


 IV  2/23/25 10:00


    2/25/25 08:57


40 MG











Laboratory Results


Laboratory Tests


2/25/25 06:11











Chemistry








Test


 2/25/25


06:11


 


Calcium Level


 8.6 mg/dL


(8.7-10.4)  L


 


Magnesium Level


 2.6 mg/dL


(1.6-2.6)








Microbiology





                                  Microbiology








 Date/Time


Source Procedure


Growth Status





 


 2/23/25 17:00


Blood Blood Culture - Preliminary


NO GROWTH AFTER 24 HOURS OF INCUBATION. Resulted


 


 2/22/25 16:00


Urine - [place] [place] Urine Culture - Final


 Complete


 


 2/22/25 15:00


Nose MRSA Screen - Final


 Complete











Assessment/Plan


Assessment/Plan





49-year-old male with a known history of morbid obesity class three, 

hypertension initially presented to the hospital with the increasing shortness a

breath as well as chest pain found to have 


1. Acute hypoxic respiratory failure requiring BiPAP support currently off of 

BiPAP , on nasal cannula


2. Elevated D-dimers ruled out pulmonary embolism


3. Elevated troponin/NSTEMI


4. New onset of AFib with RVR currently rate controlled


5. Cough and flu-like symptoms with possible pneumonia


6. Morbid obesity classIII


7. Gram-positive bacteremia 1/2, likely contamination


-repeat blood cultures are negative, continue vancomycin per pharmacy


-infectious disease consultation therapeutic Lovenox, risks benefits and 

alternatives of therapeutic Lovenox including life-threatening bleeding 

disability death explained to the patient in detail who understand verbalized 

understanding and agreeable-to plan 


Follow up 2D echo, cardiology consultation for any ischemic workup


Plan discussed with:  Patient


My Orders





                           Orders - VIVEK LEE MD








Procedure Category Date Status





  Time 


 


* Infectious [place]-  CONS 2/24/25 Transmitted





[place]  16:12 











Date of Service:  Feb 25, 2025


Billing Provider:  VIVEK LEE MD


Common Visit Codes:  [id number] INP/OBS CARE(MOD)











VIVEK LEE MD             Feb 25, 2025 15:42

## 2025-02-25 NOTE — DVHINCON2
Date of service:  Feb 25, 2025


Referring Physician


Dr Cates





Reason for Consultation


Bacteremia





History of Present Illness


Patient is a 49-year-old male presents to the hospital with complaint of chest 

pain. Patient is morbidly obese and denies any past medical history.





Patient's symptoms was progressively getting worse with substernal chest pain, 

pressure-like in nature, burning, constant, radiating to his left arm, 

associated palpitations, shortness of breaths.


Patient was seen evaluated in the ED, laboratory data showed WBC 12.0, platelets

188, sodium 140, potassium 3.4, BUN 8, creatinine 1.11, GFR 81, glucose 116, BNP

235.99, calcium 9.4, lactic acid 3.0 trending down to 1.9, O2 saturation 95% on 

BiPAP.


Chest x-ray revealed mild cardiomegaly no active cardiopulmonary disease.





Past Medical History


Denies past medical history





Past Surgical History


Denies all surgeries





Social History


The patient lives at home, denies smoking, alcohol or illicit drugs abuse.











Allergies:  


Coded Allergies:  


     NO KNOWN ALLERGIES (Unverified , 8/12/15)


Home Meds


No Active Prescriptions or Reported Meds


Current Medications





                               Current Medications








 Medications


  (Trade)  Dose


 Ordered  Sig/Hardy


 Route


 PRN Reason  Start Time


 Stop Time Status Last Admin


 


 Vancomycin HCl  250 ml @ 


 250 mls/hr  Q8H


 IV


   2/25/25 04:00


 2/25/25 11:10 DC 2/25/25 04:19














Review of Systems


Constitutional:  No: Fever, Chills, Sweats, Weakness, Malaise, Other


Eyes:  No: Pain, Vision change, Conjunctivae inflammation, Eyelid inflammation, 

Other, Redness


ENT:  No: Ear pain, Ear discharge, Nose pain, Nose discharge, Nose congestion, 

Mouth pain, Mouth swelling, Throat pain, Throat swelling, Other


Respiratory:  Shortness of breath, SOB with exertion, Other (SOB at rest); No: 

Cough, Dry, Wheezing, Hemoptysis, Pleuritic Pain, Sputum, Wheezing


Cardiovascular:  Chest Pain, Palpitations, Other (Dizzy spells, left arm pain, 

diaphoresis.); No: Orthopnea, Paroxysmal Noc. Dyspnea, Edema, Lt Headedness


Gastrointestinal:  No: Nausea, Vomiting, Abdominal Pain, Diarrhea, Constipation,

Melena, Hematochezia, Other


Genitourinary:  No Dysuria, No Frequency, No Incontinence, No Hematuria, No 

Retention, No Other


Musculoskeletal:  No: other, neck pain, shoulder pain, arm pain, back pain, hand

pain, leg pain, foot pain


Skin:  No: Rash, Lesions, Jaundice, Bruising, Other


Neurological:  No: Weakness, Numbness, Incoordination, Change in speech, 

Confusion, Seizures, Other





Vital Signs





                                   Vital Signs








  Date Time  Temp Pulse Resp B/P (MAP) Pulse Ox O2 Delivery O2 Flow Rate FiO2


 


2/25/25 12:54 98.6 78 18 119/88 (98) 95   





 98.6       


 


2/25/25 11:43      Nasal Cannula* 6 44








Physical Exam


General Appearance:  Alert, Oriented X3, Cooperative, No acute distress


HEENT:  Atraumatic, PERRLA, EOMI, Mucous membr. moist/pink


Respiratory:  Normal air movement, Other (Wheezing)


Cardiovascular:  Regular rate, Normal S1, Normal S2, No murmurs


Abdominal:  Normal bowel sounds, Soft, No tenderness, No hepatospenomegaly, No 

masses


Extremities:  No clubbing, No cyanosis, No edema, Normal pulses, No 

tenderness/swelling


Skin:  No rashes, No breakdown, No significant lesion


Neuro:  Normal gait, Normal speech, Strength at 5/5 X4 ext, Normal tone, 

Sensation intact, Cranial nerves 3-12 NL, Reflexes 2+


Psych/Mental Status:  Mental status NL, Mood NL





Labs/Diagnostic Data





                                      Labs








Test


 2/25/25


09:25 2/25/25


06:11 2/23/25


05:58 2/22/25


11:58 Range/Units


 


 


Vancomycin Level Trough 27.9 H    5-10  ug/mL


 


White Blood Count


 


 9.5 


 


 


 4.4-10.8


10^3/uL


 


Red Blood Count


 


 4.92 


 


 


 4.5-5.90


10^6/uL


 


Hemoglobin  13.8    13.5-17.5  g/dL


 


Hematocrit  42.9    41.0-53.0  %


 


Mean Corpuscular Volume  87.3    80.0-100.0  fL


 


Mean Corpuscular Hemoglobin  28.1    28.0-32.0  pg


 


Mean Corpuscular Hemoglobin


Concent 


 32.2 


 


 


 32.0-36.0  g/dL





 


Red Cell Distribution Width  16.8 H   11.8-14.3  %


 


Platelet Count


 


 188 


 


 


 140-450


10^3/uL


 


Mean Platelet Volume  9.6    6.9-10.8  fL


 


Neutrophils (%) (Auto)      37.0-80.0  %


 


Lymphocytes (%) (Auto)      10.0-50.0  %


 


Monocytes (%) (Auto)      0.0-12.0  %


 


Basophils (%) (Auto)      0.0-2.0  %


 


Neutrophils # (Auto)


 


  


 


 


 1.6-8.6  10


^3/uL


 


Lymphocytes # (Auto)


 


  


 


 


 0.4-5.4  10


^3/uL


 


Monocytes # (Auto)      0-1.3  10 ^3/uL


 


Differential Total Cells


Counted 


 100.0 


 


 


 100  





 


Neutrophils % (Manual)  63    37.0-80.0  


 


Band Neutrophils % (Manual)  0     


 


Lymphocytes % (Manual)  34    10.0-50.0  


 


Monocytes % (Manual)  2    0-12  


 


Eosinophils % (Manual)  1    0-7  


 


Basophils % (Manual)  0    0.0-2.0  


 


Metamyelocytes % (manual)  0     


 


Myelocytes % (Manual)  0     


 


Promyelocytes % (Manual)  0     


 


Blast Cells % (Manual)  0     


 


Reactive Lymphocytes  0     


 


Platelet Estimate  Adequate     


 


Sodium Level  140    136-145  mmol/L


 


Potassium Level  3.8    3.5-5.1  mmol/L


 


Chloride Level  103      mmol/L


 


Carbon Dioxide Level  27    20-31  mmol/L


 


Anion Gap  10    5-15  


 


Blood Urea Nitrogen  12    9-23  mg/dL


 


Creatinine


 


 1.02 


 


 


 0.700-1.30


mg/dL


 


Glomerular Filtration Rate


Calc 


 90 


 


 


 >90  mL/min





 


BUN/Creatinine Ratio  11.8    10.0-20.0  


 


Serum Glucose  96      mg/dL


 


Calcium Level  8.6 L   8.7-10.4  mg/dL


 


Magnesium Level  2.6    1.6-2.6  mg/dL


 


Eosinophils (%) (Auto)   0.4   0.0-7.0  %


 


Eosinophils # (Auto)   0   0-0.8  10 ^3/uL


 


Basophils # (Auto)   0.1   0-0.2  10 ^3/uL


 


Nucleated Red Blood Cells   0.3    %


 


Total Bilirubin   1.4 H  0.2-1.0  mg/dL


 


Aspartate Amino Transferase


(AST) 


 


 28 


 


 13-40  U/L





 


Alanine Aminotransferase (ALT)   28   7-40  U/L


 


Alkaline Phosphatase   51     U/L


 


Total Protein   7.6   5.7-8.2  g/dL


 


Albumin   3.8   3.2-4.8  g/dL


 


Troponin I High Sensitivity    125 *H </=54  ng/L


 


Test


 2/22/25


09:53 2/22/25


06:36 2/22/25


06:14 2/22/25


00:39 Range/Units


 


 


D-Dimer, Quantitative


 13.04 H


 


 


 


 0.0-0.49  mg/L


FEU


 


Thyroid Stimulating Hormone


(TSH) 


 1.15 


 


 


 0.55-4.78


uIU/mL


 


Triglycerides Level   102   < 150  mg/dL


 


Cholesterol Level   98   < 200  mg/dL


 


LDL Cholesterol   60   < 100  mg/dL


 


HDL Cholesterol   23 L  40-59  mg/dL


 


Blood Gas Specimen Type    Arterial   


 


Blood Gas Sample Site    Left radial   


 


Blood Gas Patient Temperature    37.0   


 


Arterial Blood Date Drawn    [id number]   


 


Arterial Blood pH    7.449  7.350-7.450  


 


Arterial Blood Partial


Pressure CO2 


 


 


 34.4 L


 35.0-48.0  mmHg





 


Arterial Blood Partial


Pressure O2 


 


 


 81.8 L


 83.0-108.0


mmHg


 


Arterial Blood HCO3


 


 


 


 23.3 


 21.0-28.0


mmol/L


 


Arterial Blood Oxygen


Saturation 


 


 


 96.3 


 94.0-98.0  %





 


Arterial Blood Base Excess


 


 


 


 0.0 


 -2.0-3.0


mmol/L


 


Arterial Blood Oxyhemoglobin    94.9  94.0-98.0  %


 


Arterial Blood


Carboxyhemoglobin 


 


 


 1.0 


 0.5-1.5  %





 


Arterial Blood Methemoglobin    0.5  0.0-1.5  %


 


Adolfo Test    Modified   


 


Blood Gas Total Hemoglobin    16.40  13.5-17.5  g/dL


 


Blood Gas Modality    Mask - bipap   


 


FiO2 %    45.0   


 


Blood Gas EPAP    5   


 


Blood Gas IPAP    10   


 


Test


 2/22/25


00:14 2/21/25


23:15 2/21/25


22:19 


 Range/Units


 


 


Lactic Acid Level 1.9     0.4-2.0  mmol/L


 


Influenza Type A Antigen  Negative    Negative  


 


Influenza Type B Antigen  Negative    Negative  


 


SARS-CoV-2 Antigen (Rapid)  Negative    NEGATIVE  


 


B-Type Natriuretic Peptide   235.99   0-100  pg/mL








                                  Microbiology








 Date/Time


Source Procedure


Growth Status





 


 2/23/25 17:00


Blood Blood Culture - Preliminary


NO GROWTH AFTER 24 HOURS OF INCUBATION. Resulted


 


 2/22/25 16:00


Urine - [facility] [place] Urine Culture - Final


 Complete


 


 2/22/25 15:00


Nose MRSA Screen - Final


 Complete











Assessment


Patient is a 49-year-old male presented to the hospital with :





Bacteremia


NSTEMI


Acute hypoxic respiratory failure


Atrial fibrillation with RVR


Sepsis, unspecified organism





Recommendations:





blood cx:1/2 +ve staphylococcus species





repeat blood cultures follow





Antibiotic status:


Vancomycin HCL [Started on 02/25 - Ongoing]


Ceftriaxone Sodium [Started on 02/22 - Ongoing]








Culture History:


02/23, Blood culture showed no growth


02/21, Blood culture showed Staphylococcus species


02/22, Urine culture showed no growth


02/22, MRSA screening was negative








Thank you for consult and for giving an opportunity to take care of this patient


Plan discussed with:  Patient











MACKENZIE ARAUZ MD            Feb 25, 2025 15:07

## 2025-02-26 NOTE — DVHPN2
Subjective


49-year-old male with a known history of morbid obesity class three initially 

presented to the hospital with increasing shortness a breath and chest pain 

found to have NSTEMI and new onset of AFib with RVR.  Today patient is off of 

BiPAP.  D-dimer is elevated, CT angio is negative for PE.  Patient is still on 6

L of oxygen by continuous nasal cannula.


Reviewed:  Care Plan


Changes from previous H/P or p:  No Changes


Eyes:  No Pain, No Vision change, No Conjunctivae inflammation, No Eyelid 

inflammation, No Other, No Redness


ENT:  No Ear pain, No Ear discharge, No Nose pain, No Nose discharge, No Nose 

congestion, No Mouth pain, No Mouth swelling, No Throat pain, No Throat 

swelling, No Other


Cardiovascular:  Chest Pain, Palpitations; No Orthopnea, No Paroxysmal Noc. 

Dyspnea, No Edema, No Lt Headedness; Other (Dizzy spells, left arm pain, 

diaphoresis.)


Respiratory:  No Cough, No Dry; Shortness of breath, SOB with excertion; No 

Wheezing, No Hemoptysis, No Pleuritic Pain, No Sputum; Other (SOB at rest)


Gastrointestinal:  No Nausea, No Vomiting, No Abdominal Pain, No Diarrhea, No 

Constipation, No Melena, No Hematochezia, No Other


Genitourinary:  No Dysuria, No Frequency, No Incontinence, No Hematuria, No 

Retention, No Other


Musculoskeletal:  No other, No neck pain, No shoulder pain, No arm pain, No back

pain, No hand pain, No leg pain, No foot pain


Skin:  No Rash, No Lesions, No Jaundice, No Bruising, No Other





Objective


Vitals





Vital Signs








  Date Time  Temp Pulse Resp B/P (MAP) Pulse Ox O2 Delivery O2 Flow Rate FiO2


 


2/26/25 11:58  62 22  95   


 


2/26/25 11:52      Mask 6.0 


 


2/26/25 11:52        50


 


2/26/25 11:00    123/89    


 


2/26/25 09:00 98.6       





 98.6       








Intake/Output











                               Intake and Output 


 


 2/26/25





 07:00


 


Intake Total 1920 ml


 


Output Total 4700 ml


 


Balance -2780 ml


 


 


 


Intake Oral 1620 ml


 


IV Total 300 ml


 


Output Urine Total 4700 ml


 


# Bowel Movements 1








Exam


HEENT pupils are reactive 


Neck is supple 


CV is S1-S2 regular rate and rhythm 


Respiratory diminished breath sounds bases


GI positive bowel sound


Extremity no edema 


CNS no motor deficit


Medications





                               Current Medications








 Medications  Dose


 Ordered  Sig/Hardy


 Route  Start Time


 Stop Time Status Last Admin


Dose Admin


 


 Ceftriaxone Sodium  50 ml @ 


 100 mls/hr  DAILY@09


 IV  2/22/25 09:00


    2/26/25 08:58


100 MLS/HR


 


 Vancomycin HCl  0 ml @ 0


 mls/hr  UD


 IV  2/22/25 04:15


     





 


 Carvedilol  12.5 mg  Q12HR


 PO  2/22/25 10:00


    2/26/25 09:00


12.5 MG


 


 Levalbuterol HCl  1.25 mg  Q6HR


 NEB  2/22/25 06:00


    2/26/25 11:52


1.25 MG


 


 Ipratropium


 Bromide  0.5 mg  Q4HPRN  PRN


 NEB  2/22/25 04:15


    2/26/25 11:52


0.5 MG


 


 Aspirin  81 mg  DAILY


 PO  2/23/25 10:00


    2/26/25 08:59


81 MG


 


 Sodium Chloride  10 ml  Q8HR


 IV  2/22/25 06:00


    2/26/25 13:50


10 ML


 


 Acetaminophen/


 Hydrocodone Bitart  1 tab  Q4HP  PRN


 PO  2/22/25 04:15


    2/25/25 21:34


1 TAB


 


 Ondansetron HCl  4 mg  Q4HP  PRN


 IV  2/22/25 04:15


     





 


 Docusate Sodium  100 mg  BIDPRN  PRN


 PO  2/22/25 04:15


     





 


 Acetaminophen  650 mg  Q6HP  PRN


 PO  2/22/25 04:15


    2/23/25 01:06


650 MG


 


 Nitroglycerin  0.4 mg  Q5MINP  PRN


 SL  2/22/25 04:15


     





 


 Morphine Sulfate  2 mg  Q30M  PRN


 IV  2/22/25 04:15


     





 


 Enoxaparin Sodium  150 mg  Q12HR


 SC  2/22/25 10:00


    2/26/25 08:59


150 MG


 


 Furosemide  40 mg  DAILY


 IV  2/23/25 10:00


   UNV  





 


 Furosemide  40 mg  DAILY


 IV  2/23/25 10:00


    2/26/25 08:58


40 MG


 


 Vancomycin HCl  250 ml @ 


 250 mls/hr  Q12H


 IV  2/25/25 20:00


    2/26/25 08:58


250 MLS/HR


 


 Melatonin  5 mg  HS  PRN


 PO  2/25/25 22:15


    2/25/25 23:18


5 MG


 


 Guaifenesin  200 mg  Q8HP  PRN


 PO  2/25/25 22:15


    2/26/25 11:56


200 MG











Laboratory Results


Laboratory Tests


2/25/25 06:11








2/26/25 06:20








Microbiology





                                  Microbiology








 Date/Time


Source Procedure


Growth Status





 


 2/23/25 17:00


Blood Blood Culture - Preliminary


NO GROWTH AFTER 48 HOURS OF INCUBATION. Resulted


 


 2/22/25 16:00


Urine - [place] [place] Urine Culture - Final


 Complete


 


 2/22/25 15:00


Nose MRSA Screen - Final


 Complete











Assessment/Plan


Assessment/Plan





49-year-old male with a known history of morbid obesity class three, 

hypertension initially presented to the hospital with the increasing shortness a

breath as well as chest pain found to have 


1. Acute hypoxic respiratory failure requiring 6 L of oxygen by continuous nasal

cannula


2. Elevated D-dimers ruled out pulmonary embolism


3. Elevated troponin/NSTEMI, cardiology cleared the patient to be discharged on 

beta blocker illness toe as well as Lasix


4. New onset of AFib with RVR currently rate controlled


5. Cough and flu-like symptoms with possible pneumonia


6. Morbid obesity classIII


7. Gram-positive bacteremia 1/2, likely contamination


-repeat blood cultures are negative, continue vancomycin per pharmacy


-infectious disease consultation, discontinue Lovenox, 


-we will titrate oxygen to less than 5 L by continuous nasal cannula.  Discharge

plan


Plan discussed with:  Patient


My Orders





                           Orders - VIVEK LEE MD








Procedure Category Date Status





  Time 


 


Enoxaparin Sodium PHA 2/27/25 Verified





 (Lovenox)  10:00 











Date of Service:  Feb 26, 2025


Billing Provider:  VIVEK LEE MD


Common Visit Codes:  [id number] INP/OBS CARE(MOD)











VIVEK LEE MD             Feb 26, 2025 14:50

## 2025-02-26 NOTE — DVHPN2
Progress Note - Dictate


Date Seen:  Feb 26, 2025


Medical Necessity Reason


Pt with a Central, PICC or Fol:  Yes


The following are medically ne:  Cuevas Catheter


Reason for cuevas catheter:  Strict I&O


Subjective


Patient remains on supplemental oxygen. . Patient  on 6 lpm NC.


vital signs





                                   Vital Sign








  Date Time  Temp Pulse Resp B/P (MAP) Pulse Ox O2 Delivery O2 Flow Rate FiO2


 


2/26/25 09:00  73  127/73    


 


2/26/25 08:43   20  92   


 


2/26/25 08:37      Room Air* 0 21


 


2/26/25 05:00 98.0       





 98.0       














                           Total Intake and Output   


 


 2/25/25 2/25/25 2/26/25





 15:00 23:00 07:00


 


Intake Total 50 ml 1070 ml 800 ml


 


Output Total 2000 ml 1900 ml 800 ml


 


Balance -1950 ml -830 ml 0 ml








medications





                               Current Medications








 Medications  Dose


 Ordered  Sig/Hardy


 Route  Start Time


 Stop Time Status Last Admin


Dose Admin


 


 Ceftriaxone Sodium  50 ml @ 


 100 mls/hr  DAILY@09


 IV  2/22/25 09:00


    2/26/25 08:58


100 MLS/HR


 


 Vancomycin HCl  0 ml @ 0


 mls/hr  UD


 IV  2/22/25 04:15


     





 


 Carvedilol  12.5 mg  Q12HR


 PO  2/22/25 10:00


    2/26/25 09:00


12.5 MG


 


 Levalbuterol HCl  1.25 mg  Q6HR


 NEB  2/22/25 06:00


    2/26/25 08:37


1.25 MG


 


 Ipratropium


 Bromide  0.5 mg  Q4HPRN  PRN


 NEB  2/22/25 04:15


    2/26/25 08:37


0.5 MG


 


 Aspirin  81 mg  DAILY


 PO  2/23/25 10:00


    2/26/25 08:59


81 MG


 


 Sodium Chloride  10 ml  Q8HR


 IV  2/22/25 06:00


    2/26/25 05:49


10 ML


 


 Acetaminophen/


 Hydrocodone Bitart  1 tab  Q4HP  PRN


 PO  2/22/25 04:15


    2/25/25 21:34


1 TAB


 


 Ondansetron HCl  4 mg  Q4HP  PRN


 IV  2/22/25 04:15


     





 


 Docusate Sodium  100 mg  BIDPRN  PRN


 PO  2/22/25 04:15


     





 


 Acetaminophen  650 mg  Q6HP  PRN


 PO  2/22/25 04:15


    2/23/25 01:06


650 MG


 


 Nitroglycerin  0.4 mg  Q5MINP  PRN


 SL  2/22/25 04:15


     





 


 Morphine Sulfate  2 mg  Q30M  PRN


 IV  2/22/25 04:15


     





 


 Enoxaparin Sodium  150 mg  Q12HR


 SC  2/22/25 10:00


    2/26/25 08:59


150 MG


 


 Furosemide  40 mg  DAILY


 IV  2/23/25 10:00


   UNV  





 


 Furosemide  40 mg  DAILY


 IV  2/23/25 10:00


    2/26/25 08:58


40 MG


 


 Vancomycin HCl  250 ml @ 


 250 mls/hr  Q12H


 IV  2/25/25 20:00


    2/26/25 08:58


250 MLS/HR


 


 Melatonin  5 mg  HS  PRN


 PO  2/25/25 22:15


    2/25/25 23:18


5 MG


 


 Guaifenesin  200 mg  Q8HP  PRN


 PO  2/25/25 22:15


    2/25/25 23:20


200 MG








objective


General Appearance:  Alert, Oriented X3, Cooperative, No acute distress


HEENT:  Atraumatic, PERRLA, EOMI, Mucous membr. moist/pink


Respiratory:  Normal air movement, Other (Wheezing)


Cardiovascular:  Regular rate, Normal S1, Normal S2, No murmurs


Abdominal:  Normal bowel sounds, Soft, No tenderness, No hepatospenomegaly, No 

masses


Extremities:  No clubbing, No cyanosis, No edema, Normal pulses, No 

tenderness/swelling


Skin:  No rashes, No breakdown, No significant lesion


Neuro:  Normal gait, Normal speech, Strength at 5/5 X4 ext, Normal tone, 

Sensation intact, Cranial nerves 3-12 NL, Reflexes 2+


Psych/Mental Status:  Mental status NL, Mood NL


laboratory and microbiology


                                Laboratory Tests


2/26/25 06:20








2/25/25 06:11

















Test


 2/25/25


06:11 Range/Units


 


 


Serum Glucose 96    mg/dL








Assessment/Plan


Patient is a 49-year-old male presented to the hospital with :





Bacteremia


NSTEMI


Acute hypoxic respiratory failure


Atrial fibrillation with RVR


Sepsis, unspecified organism





Recommendations:





blood cx:1/2 +ve staphylococcus species





repeat blood cultures follow





dc antibiotics





CTA chest inconclusive for PE


pulmonary edema +





Antibiotic status:


Vancomycin HCL [Started on 02/25 - Ongoing]


Ceftriaxone Sodium [Started on 02/22 - Ongoing]








Culture History:


02/23, Blood culture showed no growth


02/21, Blood culture showed Staphylococcus species


02/22, Urine culture showed no growth


02/22, MRSA screening was negative








Thank you for consult and for giving an opportunity to take care of this patient


Plan discussed with:  MACKENZIE Logan MD            Feb 26, 2025 10:07

## 2025-02-27 NOTE — DVHPN2
Progress Note - Dictate


Date Seen:  Feb 27, 2025


Medical Necessity Reason


Pt with a Central, PICC or Fol:  Yes


The following are medically ne:  Cuevas Catheter


Reason for cuevas catheter:  Strict I&O


Subjective


Patient seen and examined at bedside.


Remains on supplemental oxygen


Overnight events reviewed.


vital signs





                                   Vital Sign








  Date Time  Temp Pulse Resp B/P (MAP) Pulse Ox O2 Delivery O2 Flow Rate FiO2


 


2/27/25 21:05  97  114/86    


 


2/27/25 21:00 98.3  20  94   





 98.3       


 


2/27/25 13:45      Nasal Cannula 4.0 


 


2/27/25 13:45        36














                           Total Intake and Output   


 


 2/26/25 2/26/25 2/27/25





 15:00 23:00 07:00


 


Intake Total 300 ml 673 ml 900 ml


 


Output Total 900 ml 600 ml 


 


Balance -600 ml 73 ml 900 ml








medications





                               Current Medications








 Medications  Dose


 Ordered  Sig/Hardy


 Route  Start Time


 Stop Time Status Last Admin


Dose Admin


 


 Carvedilol  12.5 mg  Q12HR


 PO  2/22/25 10:00


    2/27/25 21:05


12.5 MG


 


 Levalbuterol HCl  1.25 mg  Q6HR


 NEB  2/22/25 06:00


    2/27/25 19:30


1.25 MG


 


 Ipratropium


 Bromide  0.5 mg  Q4HPRN  PRN


 NEB  2/22/25 04:15


    2/27/25 13:45


0.5 MG


 


 Aspirin  81 mg  DAILY


 PO  2/23/25 10:00


    2/27/25 09:19


81 MG


 


 Sodium Chloride  10 ml  Q8HR


 IV  2/22/25 06:00


    2/27/25 21:05


10 ML


 


 Acetaminophen/


 Hydrocodone Bitart  1 tab  Q4HP  PRN


 PO  2/22/25 04:15


    2/25/25 21:34


1 TAB


 


 Ondansetron HCl  4 mg  Q4HP  PRN


 IV  2/22/25 04:15


     





 


 Docusate Sodium  100 mg  BIDPRN  PRN


 PO  2/22/25 04:15


     





 


 Acetaminophen  650 mg  Q6HP  PRN


 PO  2/22/25 04:15


    2/23/25 01:06


650 MG


 


 Nitroglycerin  0.4 mg  Q5MINP  PRN


 SL  2/22/25 04:15


     





 


 Morphine Sulfate  2 mg  Q30M  PRN


 IV  2/22/25 04:15


     





 


 Furosemide  40 mg  DAILY


 IV  2/23/25 10:00


   UNV  





 


 Furosemide  40 mg  DAILY


 IV  2/23/25 10:00


    2/27/25 09:21


40 MG


 


 Melatonin  5 mg  HS  PRN


 PO  2/25/25 22:15


    2/27/25 20:44


5 MG


 


 Guaifenesin  200 mg  Q8HP  PRN


 PO  2/25/25 22:15


    2/27/25 20:43


200 MG


 


 Sacubitril/


 Valsartan  1 tab  BID


 PO  2/26/25 22:00


    2/27/25 21:03


1 TAB


 


 Apixaban  5 mg  BIDPC


 PO  2/26/25 19:00


    2/27/25 20:43


5 MG








objective


Gen.: Patient lying in bed in no apparent distress. On supplemental oxygen.


Head: Normocephalic, atraumatic.


Eyes: EOMI/PERRLA.


Ears: Normal hearing. Normal anatomy.


Neck/trachea: Trachea midline, supple.


Nose: Normal external anatomy.


Mouth: Moist mucous membranes.


Chest: Decreased air entry bilaterally. No wheezing or rhonchi.


Cardiovascular: Positive S1, positive S2. Regular rate and rhythm.


Abdomen: Positive bowel sounds in all 4 quadrants. Soft, non-tender, non-

distended.


: Deferred.


Rectal: Deferred.


Skin: Warm, dry. Intact.


Extremities: 2+ radial pulses bilaterally. No lower extremity edema.


Neuro: Awake, alert, oriented x3. No gross motor or sensory deficits. Cranial 

nerves II through XII intact. Gait not assessed.


laboratory and microbiology


                                Laboratory Tests


2/26/25 06:20








2/25/25 06:11

















Test


 2/25/25


06:11 Range/Units


 


 


Serum Glucose 96    mg/dL








Assessment/Plan


Impression:


Acute hypoxic respiratory failure


CHF exacerbation


Dyspnea


Morbid obesity BMI 57.9


Elevated troponin





Events:


Supplemental oxygen, 5 LPM --> 4 LPM NC


Taper O2 as tolerated


Improved O2 requirements


Arrange for home oxygen





Continue bronchodilators


Continue antibiotics


WBC within normal limits


Repeat blood cultures show no growth after 72 hours


Urine cultures show no growth after 48 hours.





Therapeutic Lovenox





PT evaluation





Continue diuresis w/ Lasix


Monitor renal function.


Monitor electrolytes. Supplement as necessary.





Patient is stable for discharge from the pulmonary standpoint.


Disposition per hospitalist.


Follow up in 1-2 weeks in Pulmonary Clinic.


Recommend outpatient sleep study





Labs and imaging reviewed.


Rest of plan as noted below.





Plan:


Supplemental O2


Titrate to keep O2 sats above 92%.





Continue bronchodilators


Antibiotics


Follow up cultures


Blood cultures show no growth after 72 hours in one set; one set notable for GPC

in clusters.


Repeat blood cultures show no growth after 72 hours


Urine cultures show no growth after 48 hours.





Follow up Echo report


Cardiology recs appreciated





Susie w/ Lasix


Monitor renal function.


Monitor electrolytes. Supplement as necessary.


Monitor ins and outs.





Diet and lifestyle modifications for weight reduction


Morbid obesity - complicates all care





Recommend outpatient sleep study to r/o COLLEEN.





DVT prophylaxis.





Prognosis: Guarded given patient's multiple co-morbidities.





Rest of plan per hospitalist and other consultants.





Thank you, ALPA Cancino, for allowing me to participate in this patient's care.


Further recommendations will depend on the patient's clinical course.


Please do not hesitate to contact me if you have any questions or concerns.





This medical document was created using an electronic medical record system with

Dragon computerized dictation system. Although these documentations are being 

carefully reviewed, there may still be some phonetic and typographical changes. 

The errors are purely typographical, due to imperfection on the software 

program, and do not reflect any compromise in the patient's medical care.


Plan discussed with:  Patient, Other (AD Serrano)











MARIANNA BARNETT MD             Feb 27, 2025 22:48

## 2025-02-27 NOTE — DVHDS2
Discharge Summary


Date of Admission


Feb 22, 2025 at 04:12





Date of Discharge:


Feb 27, 2025





Labs/Diagnostic Data:





                               Laboratory Results








Test


 2/26/25


15:42 2/26/25


06:20 2/25/25


15:32 2/25/25


09:25


 


Blood Gas Specimen Type Arterial    


 


Blood Gas Sample Site Right radial    


 


Blood Gas Patient Temperature 37.0    


 


Arterial Blood Date Drawn [id number]    


 


Arterial Blood pH


 7.418


(7.350-7.450) 


 


 





 


Arterial Blood Partial


Pressure CO2 48.9 mmHg


(35.0-48.0) 


 


 





 


Arterial Blood Partial


Pressure O2 49.9 mmHg


(83.0-108.0) 


 


 





 


Arterial Blood HCO3


 30.9 mmol/L


(21.0-28.0) 


 


 





 


Arterial Blood Oxygen


Saturation 85.8 %


(94.0-98.0) 


 


 





 


Arterial Blood Base Excess


 5.2 mmol/L


(-2.0-3.0) 


 


 





 


Arterial Blood Oxyhemoglobin


 84.9 %


(94.0-98.0) 


 


 





 


Arterial Blood


Carboxyhemoglobin 0.7 %


(0.5-1.5) 


 


 





 


Arterial Blood Methemoglobin


 0.3 %


(0.0-1.5) 


 


 





 


Adolfo Test Yes    


 


Blood Gas Total Hemoglobin


 14.90 g/dL


(13.5-17.5) 


 


 





 


Blood Gas Modality Room air    


 


FiO2 % 21.0    


 


Blood Gas Critical Value Read


Back yes 


 


 


 





 


Blood Gas Notified Whom ginny Lee md    


 


Blood Gas Notified Time [id number]    


 


Blood Gas Notified By     


 


Creatinine


 


 1.02 mg/dL


(0.700-1.30) 


 





 


Glomerular Filtration Rate


Calc 


 90 mL/min


(>90) 


 





 


Random Vancomycin Level


 


 


 19.2 ug/mL


(5-10) 





 


Vancomycin Level Trough


 


 


 


 27.9 ug/mL


(5-10)


 


Test


 2/25/25


06:11 2/23/25


05:58 2/22/25


11:58 2/22/25


09:53


 


White Blood Count


 9.5 10^3/uL


(4.4-10.8) 


 


 





 


Red Blood Count


 4.92 10^6/uL


(4.5-5.90) 


 


 





 


Hemoglobin


 13.8 g/dL


(13.5-17.5) 


 


 





 


Hematocrit


 42.9 %


(41.0-53.0) 


 


 





 


Mean Corpuscular Volume


 87.3 fL


(80.0-100.0) 


 


 





 


Mean Corpuscular Hemoglobin


 28.1 pg


(28.0-32.0) 


 


 





 


Mean Corpuscular Hemoglobin


Concent 32.2 g/dL


(32.0-36.0) 


 


 





 


Red Cell Distribution Width


 16.8 %


(11.8-14.3) 


 


 





 


Platelet Count


 188 10^3/uL


(140-450) 


 


 





 


Mean Platelet Volume


 9.6 fL


(6.9-10.8) 


 


 





 


Neutrophils (%) (Auto)  % (37.0-80.0)    


 


Lymphocytes (%) (Auto)  % (10.0-50.0)    


 


Monocytes (%) (Auto)  % (0.0-12.0)    


 


Basophils (%) (Auto)  % (0.0-2.0)    


 


Neutrophils # (Auto)


 10 ^3/uL


(1.6-8.6) 


 


 





 


Lymphocytes # (Auto)


 10 ^3/uL


(0.4-5.4) 


 


 





 


Monocytes # (Auto)


 10 ^3/uL


(0-1.3) 


 


 





 


Differential Total Cells


Counted 100.0 (100) 


 


 


 





 


Neutrophils % (Manual) 63 (37.0-80.0)    


 


Band Neutrophils % (Manual) 0    


 


Lymphocytes % (Manual) 34 (10.0-50.0)    


 


Monocytes % (Manual) 2 (0-12)    


 


Eosinophils % (Manual) 1 (0-7)    


 


Basophils % (Manual) 0 (0.0-2.0)    


 


Metamyelocytes % (manual) 0    


 


Myelocytes % (Manual) 0    


 


Promyelocytes % (Manual) 0    


 


Blast Cells % (Manual) 0    


 


Reactive Lymphocytes 0    


 


Platelet Estimate Adequate    


 


Sodium Level


 140 mmol/L


(136-145) 


 


 





 


Potassium Level


 3.8 mmol/L


(3.5-5.1) 


 


 





 


Chloride Level


 103 mmol/L


() 


 


 





 


Carbon Dioxide Level


 27 mmol/L


(20-31) 


 


 





 


Anion Gap 10 (5-15)    


 


Blood Urea Nitrogen


 12 mg/dL


(9-23) 


 


 





 


BUN/Creatinine Ratio


 11.8


(10.0-20.0) 


 


 





 


Serum Glucose


 96 mg/dL


() 


 


 





 


Calcium Level


 8.6 mg/dL


(8.7-10.4) 


 


 





 


Magnesium Level


 2.6 mg/dL


(1.6-2.6) 


 


 





 


Eosinophils (%) (Auto)


 


 0.4 %


(0.0-7.0) 


 





 


Eosinophils # (Auto)


 


 0 10 ^3/uL


(0-0.8) 


 





 


Basophils # (Auto)


 


 0.1 10 ^3/uL


(0-0.2) 


 





 


Nucleated Red Blood Cells  0.3 %   


 


Total Bilirubin


 


 1.4 mg/dL


(0.2-1.0) 


 





 


Aspartate Amino Transferase


(AST) 


 28 U/L (13-40) 


 


 





 


Alanine Aminotransferase (ALT)  28 U/L (7-40)   


 


Alkaline Phosphatase


 


 51 U/L


() 


 





 


Total Protein


 


 7.6 g/dL


(5.7-8.2) 


 





 


Albumin


 


 3.8 g/dL


(3.2-4.8) 


 





 


Troponin I High Sensitivity


 


 


 125 ng/L


(</=54) 





 


D-Dimer, Quantitative


 


 


 


 13.04 mg/L FEU


(0.0-0.49)


 


Test


 2/22/25


06:36 2/22/25


06:14 2/22/25


00:39 2/22/25


00:14


 


Thyroid Stimulating Hormone


(TSH) 1.15 uIU/mL


(0.55-4.78) 


 


 





 


Triglycerides Level


 


 102 mg/dL (<


150) 


 





 


Cholesterol Level


 


 98 mg/dL (<


200) 


 





 


LDL Cholesterol


 


 60 mg/dL (<


100) 


 





 


HDL Cholesterol


 


 23 mg/dL


(40-59) 


 





 


Blood Gas EPAP   5  


 


Blood Gas IPAP   10  


 


Lactic Acid Level


 


 


 


 1.9 mmol/L


(0.4-2.0)


 


Test


 2/21/25


23:15 2/21/25


22:19 


 





 


Influenza Type A Antigen


 Negative


(Negative) 


 


 





 


Influenza Type B Antigen


 Negative


(Negative) 


 


 





 


SARS-CoV-2 Antigen (Rapid)


 Negative


(NEGATIVE) 


 


 





 


B-Type Natriuretic Peptide


 


 235.99 pg/mL


(0-100) 


 








                             Other Laboratory Tests


2/26/25 06:20








2/25/25 06:11











Brief Hx & Hospital Course:


49-year-old male with a known history of morbid obesity class III, hypertension 

initially presented to the hospital with the increasing shortness a breath as 

well as chest pain found to have NSTEMI.  Also acute hypoxic respiratory failure

requiring 6 L of oxygen by continuous nasal cannula.  Patient was initially on 

BiPAP.  Patient was found to have NSTEMI cardiology was consulted who 

recommended beta-blocker has been as Lasix.  Patient also has a new onset of 

AFib with the RVR was given therapeutic Lovenox.  CT angio shows no evidence of 

PE.  Patient was found to have 1/2 blood culture positive which was likely 

contamination has been infectious disease.  Patient was did receive vancomycin 

and Rocephin during the hospital stay.  Patient was being discharged under 

stable condition with the home health home oxygen and medication as prescribed.





Condition at Discharge:


Stable





Final Diagnosis/Problems List





49-year-old male with a known history of morbid obesity class three, 


hypertension initially presented to the hospital with the increasing 


shortness a breath as well as chest pain found to have 





1. Acute hypoxic respiratory failure requiring 6 L of oxygen by continuous 


nasal cannula





2. Elevated D-dimers ruled out pulmonary embolism





3. Elevated troponin/NSTEMI, cardiology cleared the patient to be 


discharged on beta blocker/Lasix and Eliquis





4. New onset of AFib with RVR currently rate controlled





5. Cough and flu-like symptoms with possible pneumonia, treated





6. Morbid obesity classIII





7. Gram-positive bacteremia 1/2, likely contamination





Discharge Disposition:


Home with Health Services





SNF Discharge


Will this Physician continue t:  No





Discharge Instruct/Medications


Diet:  Cardiac 2g Na,low cholest


Activity:  No Restrictions, As Tolerated


Follow Up/Referral:  


Follow up with the PCP in 1-2 weeks 





Follow up with Cardiology Dr. Allen in one week 





Follow up with Dr. [facility] for outpatient sleep study


Medications:  


As prescribed


Discharge Statement:


"Patient was advised to return to the ER or call 911 if any headaches, 

dizziness, shortness of breath, chest pain, abdominal pain, bleeding, fevers, or

worsening of medical condition.





Patient was counseled about treatment plan, medications, possible side effects, 

patientverbalized understanding. All questions were answered to the best of my 

ability.





This discharge took greater then 30 minutes in planning, reviewing 

documentation, counseling the patient, and discussing with other team members."





ASSESSMENT


49-year-old male with a known history of morbid obesity class three, 

hypertension initially presented to the hospital with the increasing shortness a

breath as well as chest pain found to have 


1. Acute hypoxic respiratory failure requiring 6 L of oxygen by continuous nasal

cannula


2. Elevated D-dimers ruled out pulmonary embolism


3. Elevated troponin/NSTEMI, cardiology cleared the patient to be discharged on 

beta blocker/Lasix and Eliquis


4. New onset of AFib with RVR currently rate controlled


5. Cough and flu-like symptoms with possible pneumonia, treated


6. Morbid obesity classIII


7. Gram-positive bacteremia 1/2, likely contamination





Date of Service:  Feb 27, 2025


Billing Provider:  VIVEK LEE MD


Common Visit Codes:  42326-[facility]/OBS DISCH DAY >30min











VIVEK LEE MD             Feb 27, 2025 16:20 Abdominal Pain, N/V/D

## 2025-02-27 NOTE — DVHPN2
Progress Note - Dictate


Date Seen:  Feb 27, 2025


Medical Necessity Reason


Pt with a Central, PICC or Fol:  Yes


The following are medically ne:  Cuevas Catheter


Reason for cuevas catheter:  Strict I&O


Subjective


No new acute complaint noted at this time. Patient is stable clinically.


vital signs





                                   Vital Sign








  Date Time  Temp Pulse Resp B/P (MAP) Pulse Ox O2 Delivery O2 Flow Rate FiO2


 


2/27/25 09:21    121/70    


 


2/27/25 09:20  80      


 


2/27/25 09:11 97.9  20  96   





 97.9       


 


2/27/25 07:48      Nasal Cannula* 5 40














                           Total Intake and Output   


 


 2/26/25 2/26/25 2/27/25





 15:00 23:00 07:00


 


Intake Total 300 ml 673 ml 900 ml


 


Output Total 900 ml 600 ml 


 


Balance -600 ml 73 ml 900 ml








medications





                               Current Medications








 Medications  Dose


 Ordered  Sig/Hardy


 Route  Start Time


 Stop Time Status Last Admin


Dose Admin


 


 Carvedilol  12.5 mg  Q12HR


 PO  2/22/25 10:00


    2/27/25 09:20


12.5 MG


 


 Levalbuterol HCl  1.25 mg  Q6HR


 NEB  2/22/25 06:00


    2/27/25 07:48


1.25 MG


 


 Ipratropium


 Bromide  0.5 mg  Q4HPRN  PRN


 NEB  2/22/25 04:15


    2/27/25 07:48


0.5 MG


 


 Aspirin  81 mg  DAILY


 PO  2/23/25 10:00


    2/27/25 09:19


81 MG


 


 Sodium Chloride  10 ml  Q8HR


 IV  2/22/25 06:00


    2/27/25 06:23


10 ML


 


 Acetaminophen/


 Hydrocodone Bitart  1 tab  Q4HP  PRN


 PO  2/22/25 04:15


    2/25/25 21:34


1 TAB


 


 Ondansetron HCl  4 mg  Q4HP  PRN


 IV  2/22/25 04:15


     





 


 Docusate Sodium  100 mg  BIDPRN  PRN


 PO  2/22/25 04:15


     





 


 Acetaminophen  650 mg  Q6HP  PRN


 PO  2/22/25 04:15


    2/23/25 01:06


650 MG


 


 Nitroglycerin  0.4 mg  Q5MINP  PRN


 SL  2/22/25 04:15


     





 


 Morphine Sulfate  2 mg  Q30M  PRN


 IV  2/22/25 04:15


     





 


 Furosemide  40 mg  DAILY


 IV  2/23/25 10:00


   UNV  





 


 Furosemide  40 mg  DAILY


 IV  2/23/25 10:00


    2/27/25 09:21


40 MG


 


 Melatonin  5 mg  HS  PRN


 PO  2/25/25 22:15


    2/26/25 21:36


5 MG


 


 Guaifenesin  200 mg  Q8HP  PRN


 PO  2/25/25 22:15


    2/27/25 09:33


200 MG


 


 Sacubitril/


 Valsartan  1 tab  BID


 PO  2/26/25 22:00


    2/27/25 09:19


1 TAB


 


 Apixaban  5 mg  BIDPC


 PO  2/26/25 19:00


    2/27/25 09:19


5 MG








objective


General Appearance:  Alert, Oriented X3, Cooperative, No acute distress


HEENT:  Atraumatic, PERRLA, EOMI, Mucous membr. moist/pink


Respiratory:  Normal air movement, Other (Wheezing)


Cardiovascular:  Regular rate, Normal S1, Normal S2, No murmurs


Abdominal:  Normal bowel sounds, Soft, No tenderness, No hepatospenomegaly, No 

masses


Extremities:  No clubbing, No cyanosis, No edema, Normal pulses, No 

tenderness/swelling


Skin:  No rashes, No breakdown, No significant lesion


Neuro:  Normal gait, Normal speech, Strength at 5/5 X4 ext, Normal tone, 

Sensation intact, Cranial nerves 3-12 NL, Reflexes 2+


Psych/Mental Status:  Mental status NL, Mood NL


laboratory and microbiology


                                Laboratory Tests


2/26/25 06:20








2/25/25 06:11

















Test


 2/25/25


06:11 Range/Units


 


 


Serum Glucose 96    mg/dL








Assessment/Plan


Patient is a 49-year-old male presented to the hospital with :





Bacteremia


NSTEMI


Acute hypoxic respiratory failure


Atrial fibrillation with RVR


Sepsis, unspecified organism





Recommendations:





blood cx:1/2 +ve staphylococcus species





repeat blood cultures follow





dc antibiotics





CTA chest inconclusive for PE


pulmonary edema +





Antibiotic status:


Vancomycin HCL [Started on 02/25 - Ongoing]


Ceftriaxone Sodium [Started on 02/22 - Ongoing]








Culture History:


02/23, Blood culture showed no growth


02/21, Blood culture showed Staphylococcus species


02/22, Urine culture showed no growth


02/22, MRSA screening was negative








Thank you for consult and for giving an opportunity to take care of this patient


Plan discussed with:  MACKENZIE Logan MD            Feb 27, 2025 12:27

## 2025-02-27 NOTE — CONS
Pharmacy Clinical Information:


CQM HF (missing MRA and SGLT2). At this time patient has no contraindications to

starting an MRA or SGLT2 (renal function, BP, and potassium levels appear 

stable). Please consider starting an MRA and SGLT2 at the discretion of the 

cardiology team.











CHAN KWON PHARMACIST Feb 27, 2025 09:10

## 2025-02-28 NOTE — DVHPN2
Progress Note - Dictate


Date Seen:  Feb 28, 2025


Medical Necessity Reason


Pt with a Central, PICC or Fol:  Yes


The following are medically ne:  Cuevas Catheter


Reason for cuevas catheter:  Strict I&O


Subjective


Patient seen and examined at bedside.


Remains on supplemental oxygen


Overnight events reviewed.


vital signs





                                   Vital Sign








  Date Time  Temp Pulse Resp B/P (MAP) Pulse Ox O2 Delivery O2 Flow Rate FiO2


 


2/28/25 21:16  77  109/61    


 


2/28/25 21:00 97.6  18  96   





 97.6       


 


2/28/25 19:03      Hi-Flow NC 6 N/A














                           Total Intake and Output   


 


 2/27/25 2/27/25 2/28/25





 15:00 23:00 07:00


 


Intake Total  900 ml 1800 ml


 


Output Total  5200 ml 1300 ml


 


Balance  -4300 ml 500 ml








medications





                               Current Medications








 Medications  Dose


 Ordered  Sig/Hardy


 Route  Start Time


 Stop Time Status Last Admin


Dose Admin


 


 Carvedilol  12.5 mg  Q12HR


 PO  2/22/25 10:00


    2/28/25 21:16


12.5 MG


 


 Levalbuterol HCl  1.25 mg  Q6HR


 NEB  2/22/25 06:00


    2/28/25 19:07


1.25 MG


 


 Ipratropium


 Bromide  0.5 mg  Q4HPRN  PRN


 NEB  2/22/25 04:15


    2/28/25 19:07


0.5 MG


 


 Aspirin  81 mg  DAILY


 PO  2/23/25 10:00


    2/28/25 09:33


81 MG


 


 Sodium Chloride  10 ml  Q8HR


 IV  2/22/25 06:00


    2/28/25 21:16


10 ML


 


 Acetaminophen/


 Hydrocodone Bitart  1 tab  Q4HP  PRN


 PO  2/22/25 04:15


    2/28/25 18:23


1 TAB


 


 Ondansetron HCl  4 mg  Q4HP  PRN


 IV  2/22/25 04:15


     





 


 Docusate Sodium  100 mg  BIDPRN  PRN


 PO  2/22/25 04:15


     





 


 Acetaminophen  650 mg  Q6HP  PRN


 PO  2/22/25 04:15


    2/23/25 01:06


650 MG


 


 Nitroglycerin  0.4 mg  Q5MINP  PRN


 SL  2/22/25 04:15


     





 


 Morphine Sulfate  2 mg  Q30M  PRN


 IV  2/22/25 04:15


     





 


 Furosemide  40 mg  DAILY


 IV  2/23/25 10:00


   UNV  





 


 Furosemide  40 mg  DAILY


 IV  2/23/25 10:00


    2/28/25 09:32


40 MG


 


 Melatonin  5 mg  HS  PRN


 PO  2/25/25 22:15


    2/27/25 20:44


5 MG


 


 Guaifenesin  200 mg  Q8HP  PRN


 PO  2/25/25 22:15


    2/28/25 21:15


200 MG


 


 Sacubitril/


 Valsartan  1 tab  BID


 PO  2/26/25 22:00


    2/28/25 21:15


1 TAB


 


 Apixaban  5 mg  BIDPC


 PO  2/26/25 19:00


    2/28/25 18:22


5 MG








objective


Gen.: Patient lying in bed in no apparent distress. On supplemental oxygen.


Head: Normocephalic, atraumatic.


Eyes: EOMI/PERRLA.


Ears: Normal hearing. Normal anatomy.


Neck/trachea: Trachea midline, supple.


Nose: Normal external anatomy.


Mouth: Moist mucous membranes.


Chest: Decreased air entry bilaterally. No wheezing or rhonchi.


Cardiovascular: Positive S1, positive S2. Regular rate and rhythm.


Abdomen: Positive bowel sounds in all 4 quadrants. Soft, non-tender, non-

distended.


: Deferred.


Rectal: Deferred.


Skin: Warm, dry. Intact.


Extremities: 2+ radial pulses bilaterally. No lower extremity edema.


Neuro: Awake, alert, oriented x3. No gross motor or sensory deficits. Cranial 

nerves II through XII intact. Gait not assessed.


laboratory and microbiology


                                Laboratory Tests


2/26/25 06:20








2/25/25 06:11

















Test


 2/25/25


06:11 Range/Units


 


 


Serum Glucose 96    mg/dL








Assessment/Plan


Impression:


Acute hypoxic respiratory failure


CHF exacerbation


Dyspnea


Morbid obesity BMI 57.9


Elevated troponin





Events:


Supplemental oxygen, remains on 4 LPM NC


Taper O2 as tolerated


Arrange for home oxygen





Continue bronchodilators


Continue antibiotics





Repeat blood cultures show no growth after 72 hours


Urine cultures show no growth after 48 hours.





PT evaluation





Continue diuresis w/ Lasix


Monitor renal function.


Monitor electrolytes. Supplement as necessary.





Patient is stable for discharge from the pulmonary standpoint.


Disposition per hospitalist.


Home health arranged


Follow up in 1-2 weeks in Pulmonary Clinic.


Recommend outpatient sleep study





Labs and imaging reviewed.


Rest of plan as noted below.





Plan:


Supplemental O2


Titrate to keep O2 sats above 92%.





Continue bronchodilators


Antibiotics


Follow up cultures


Blood cultures show no growth after 72 hours in one set; one set notable for GPC

in clusters.


Repeat blood cultures show no growth after 72 hours


Urine cultures show no growth after 48 hours.





Follow up Echo report


Cardiology recs appreciated





Susie w/ Lasix


Monitor renal function.


Monitor electrolytes. Supplement as necessary.


Monitor ins and outs.





Diet and lifestyle modifications for weight reduction


Morbid obesity - complicates all care





Recommend outpatient sleep study to r/o COLLEEN.





DVT prophylaxis.





Prognosis: Guarded given patient's multiple co-morbidities.





Rest of plan per hospitalist and other consultants.





Thank you, ALPA Cancino, for allowing me to participate in this patient's care.


Further recommendations will depend on the patient's clinical course.


Please do not hesitate to contact me if you have any questions or concerns.





This medical document was created using an electronic medical record system with

Dragon computerized dictation system. Although these documentations are being 

carefully reviewed, there may still be some phonetic and typographical changes. 

The errors are purely typographical, due to imperfection on the software 

program, and do not reflect any compromise in the patient's medical care.


Plan discussed with:  Patient, Other (AD Younger)











MARIANNA BARNETT MD             Feb 28, 2025 23:16

## 2025-02-28 NOTE — DVHPN2
Progress Note - Dictate


Date Seen:  Feb 28, 2014


Medical Necessity Reason


Pt with a Central, PICC or Fol:  Yes


The following are medically ne:  Cuevas Catheter


Reason for cuevas catheter:  Strict I&O


Subjective


No new acute complaint noted at this time. Patient is stable clinically.


He is expected to be discharged tomorrow.


vital signs





                                   Vital Sign








  Date Time  Temp Pulse Resp B/P (MAP) Pulse Ox O2 Delivery O2 Flow Rate FiO2


 


2/28/25 09:56  86 20 102/79   6.0 55


 


2/28/25 09:00 97.3    96   





 97.3       


 


2/28/25 08:00      Nasal Cannula*  














                           Total Intake and Output   


 


 2/27/25 2/27/25 2/28/25





 15:00 23:00 07:00


 


Intake Total  900 ml 1800 ml


 


Output Total  5200 ml 1300 ml


 


Balance  -4300 ml 500 ml








medications





                               Current Medications








 Medications  Dose


 Ordered  Sig/Hardy


 Route  Start Time


 Stop Time Status Last Admin


Dose Admin


 


 Carvedilol  12.5 mg  Q12HR


 PO  2/22/25 10:00


    2/28/25 09:33


12.5 MG


 


 Levalbuterol HCl  1.25 mg  Q6HR


 NEB  2/22/25 06:00


    2/28/25 07:12


1.25 MG


 


 Ipratropium


 Bromide  0.5 mg  Q4HPRN  PRN


 NEB  2/22/25 04:15


    2/28/25 07:13


0.5 MG


 


 Aspirin  81 mg  DAILY


 PO  2/23/25 10:00


    2/28/25 09:33


81 MG


 


 Sodium Chloride  10 ml  Q8HR


 IV  2/22/25 06:00


    2/27/25 21:05


10 ML


 


 Acetaminophen/


 Hydrocodone Bitart  1 tab  Q4HP  PRN


 PO  2/22/25 04:15


    2/28/25 02:22


1 TAB


 


 Ondansetron HCl  4 mg  Q4HP  PRN


 IV  2/22/25 04:15


     





 


 Docusate Sodium  100 mg  BIDPRN  PRN


 PO  2/22/25 04:15


     





 


 Acetaminophen  650 mg  Q6HP  PRN


 PO  2/22/25 04:15


    2/23/25 01:06


650 MG


 


 Nitroglycerin  0.4 mg  Q5MINP  PRN


 SL  2/22/25 04:15


     





 


 Morphine Sulfate  2 mg  Q30M  PRN


 IV  2/22/25 04:15


     





 


 Furosemide  40 mg  DAILY


 IV  2/23/25 10:00


   UNV  





 


 Furosemide  40 mg  DAILY


 IV  2/23/25 10:00


    2/28/25 09:32


40 MG


 


 Melatonin  5 mg  HS  PRN


 PO  2/25/25 22:15


    2/27/25 20:44


5 MG


 


 Guaifenesin  200 mg  Q8HP  PRN


 PO  2/25/25 22:15


    2/28/25 09:35


200 MG


 


 Sacubitril/


 Valsartan  1 tab  BID


 PO  2/26/25 22:00


    2/28/25 09:33


1 TAB


 


 Apixaban  5 mg  BIDPC


 PO  2/26/25 19:00


    2/28/25 09:33


5 MG








objective


General Appearance:  Alert, Oriented X3, Cooperative, No acute distress


HEENT:  Atraumatic, PERRLA, EOMI, Mucous membr. moist/pink


Respiratory:  Normal air movement, Other (Wheezing)


Cardiovascular:  Regular rate, Normal S1, Normal S2, No murmurs


Abdominal:  Normal bowel sounds, Soft, No tenderness, No hepatospenomegaly, No 

masses


Extremities:  No clubbing, No cyanosis, No edema, Normal pulses, No 

tenderness/swelling


Skin:  No rashes, No breakdown, No significant lesion


Neuro:  Normal gait, Normal speech, Strength at 5/5 X4 ext, Normal tone, 

Sensation intact, Cranial nerves 3-12 NL, Reflexes 2+


Psych/Mental Status:  Mental status NL, Mood NL


laboratory and microbiology


                                Laboratory Tests


2/26/25 06:20








2/25/25 06:11

















Test


 2/25/25


06:11 Range/Units


 


 


Serum Glucose 96    mg/dL








Assessment/Plan


Patient is a 49-year-old male presented to the hospital with :





Bacteremia


NSTEMI


Acute hypoxic respiratory failure


Atrial fibrillation with RVR


Sepsis, unspecified organism





Recommendations:





blood cx:1/2 +ve staphylococcus species





repeat blood cultures follow





dc antibiotics





CTA chest inconclusive for PE


pulmonary edema +





Antibiotic status:


Vancomycin HCL [Started on 02/25 - Ongoing]


Ceftriaxone Sodium [Started on 02/22 - Ongoing]








Culture History:


02/23, Blood culture showed no growth


02/21, Blood culture showed Staphylococcus species


02/22, Urine culture showed no growth


02/22, MRSA screening was negative








Thank you for consult and for giving an opportunity to take care of this patient


Plan discussed with:  MACKENZIE Logan MD            Feb 28, 2025 10:36

## 2025-03-01 NOTE — DVHPN2
Progress Note - Dictate


Date Seen:  Mar 1, 2025


Medical Necessity Reason


Pt with a Central, PICC or Fol:  Yes


The following are medically ne:  Cuevas Catheter


Reason for cuevas catheter:  Strict I&O


Subjective


Patient seen and examined at bedside.


Remains on supplemental oxygen


Overnight events reviewed.


vital signs





                                   Vital Sign








  Date Time  Temp Pulse Resp B/P (MAP) Pulse Ox O2 Delivery O2 Flow Rate FiO2


 


3/1/25 17:00 97.6 57 21 100/70 (80) 92   





 97.6       


 


3/1/25 08:00      Nasal Cannula* 4 36














                           Total Intake and Output   


 


 2/28/25 2/28/25 3/1/25





 15:00 23:00 07:00


 


Intake Total  800 ml 1100 ml


 


Output Total  2400 ml 750 ml


 


Balance  -1600 ml 350 ml








medications





                               Current Medications








 Medications  Dose


 Ordered  Sig/Hardy


 Route  Start Time


 Stop Time Status Last Admin


Dose Admin


 


 Furosemide  40 mg  DAILY


 IV  2/23/25 10:00


   UNV  











objective


Gen.: Patient lying in bed in no apparent distress. On supplemental oxygen.


Head: Normocephalic, atraumatic.


Eyes: EOMI/PERRLA.


Ears: Normal hearing. Normal anatomy.


Neck/trachea: Trachea midline, supple.


Nose: Normal external anatomy.


Mouth: Moist mucous membranes.


Chest: Decreased air entry bilaterally. No wheezing or rhonchi.


Cardiovascular: Positive S1, positive S2. Regular rate and rhythm.


Abdomen: Positive bowel sounds in all 4 quadrants. Soft, non-tender, non-

distended.


: Deferred.


Rectal: Deferred.


Skin: Warm, dry. Intact.


Extremities: 2+ radial pulses bilaterally. No lower extremity edema.


Neuro: Awake, alert, oriented x3. No gross motor or sensory deficits. Cranial 

nerves II through XII intact. Gait not assessed.


laboratory and microbiology


                                Laboratory Tests


2/26/25 06:20








2/25/25 06:11

















Test


 2/25/25


06:11 Range/Units


 


 


Serum Glucose 96    mg/dL








Assessment/Plan


Impression:


Acute hypoxic respiratory failure


CHF exacerbation


Dyspnea


Morbid obesity BMI 57.9


Elevated troponin





Events:


Supplemental oxygen, remains on 4 LPM NC


Taper O2 as tolerated


Arranged for home oxygen





Continue bronchodilators


Continue Eliquis





Repeat blood cultures show no growth after 72 hours


Urine cultures show no growth after 48 hours.





PT evaluation





Continue diuresis w/ Lasix


Monitor renal function.


Monitor electrolytes. Supplement as necessary.





Patient is stable for discharge from the pulmonary standpoint.


Disposition per hospitalist.


Home health arranged


Follow up in 1-2 weeks in Pulmonary Clinic.


Recommend outpatient sleep study





Labs and imaging reviewed.


Rest of plan as noted below.





Plan:


Supplemental O2


Titrate to keep O2 sats above 92%.





Continue bronchodilators


Follow up cultures


Blood cultures show no growth after 72 hours in one set; one set notable for GPC

in clusters.


Repeat blood cultures show no growth after 72 hours


Urine cultures show no growth after 48 hours.





Follow up Echo report


Cardiology recs appreciated





Susie w/ Lasix


Monitor renal function.


Monitor electrolytes. Supplement as necessary.


Monitor ins and outs.





Diet and lifestyle modifications for weight reduction


Morbid obesity - complicates all care





Recommend outpatient sleep study to r/o COLLEEN.





DVT prophylaxis.





Prognosis: Guarded given patient's multiple co-morbidities.





Rest of plan per hospitalist and other consultants.





Thank you, ALPA Cancino, for allowing me to participate in this patient's care.


Further recommendations will depend on the patient's clinical course.


Please do not hesitate to contact me if you have any questions or concerns.





This medical document was created using an electronic medical record system with

Dragon computerized dictation system. Although these documentations are being 

carefully reviewed, there may still be some phonetic and typographical changes. 

The errors are purely typographical, due to imperfection on the software 

program, and do not reflect any compromise in the patient's medical care.


Plan discussed with:  Patient, Other (AD Younger)











MARIANNA BARNETT MD              Mar 1, 2025 23:01

## 2025-03-12 NOTE — ED.PDOC
HPI Comments


50 y/o M, with PMHX of AFIB presents to the ED for CC of chest pain. Patient 

states, that he was at [facility] when he began to experience 

sudden substernal chest pain. Patient relays, that he was discharged from [facility] 

for DX:Acute Hypoxic Respiratory Failure on 02/27/25 in association with 

symptoms present today (03/12/25). Patient comments on pain to be light in 

pressure; complains of current 4/10 pain. Patient relays shortness of breath to 

worsen with light exertion. Patient denies nausea, vomiting, fever, or chills. 

No other symptoms or modifying factors at this time.


Chief Complaint:  Chest Pain


Time Seen by MD:  16:40


Primary Care Provider:  unknown


Reviewed Notes:  Nurses Notes, Medications, Allergies


Allergies:  


Coded Allergies:  


     NO KNOWN ALLERGIES (Unverified , 8/12/15)


Home Meds


Active Scripts


Potassium Chloride (Klor-Con M10) 10 Meq Tab, 1 TAB PO DAILY, #30 TAB 5 Refills


   Prov:VIVEK LEE MD         2/27/25


Furosemide (Lasix) 40 Mg Tab, 40 MG PO DAILY, #60 TAB


   Prov:VIVEK LEE MD         2/27/25


Sacubitril-Valsartan (Entresto 24-26 mg) 1 Tab Tab, 1 TAB PO BID, #60 TAB


   Prov:VIVEK LEE MD         2/27/25


Carvedilol (COREG) 12.5 Mg Tab, 12.5 MG PO Q12HR, #60 TAB


   Prov:VIVEK LEE MD         2/27/25


Aspirin (Aspir-Low) 81 Mg Tab, 81 MG PO DAILY for 30 Days, #30 TAB


   Prov:VIVEK LEE MD         2/27/25


Apixaban Base (ELIQUIS) 5 Mg Tab, 5 MG PO BIDPC, #60 TAB


   Prov:VIVEK LEE MD         2/27/25


Information Source:  Patient


Mode of Arrival:  Ambulatory


Severity:  Moderate


Timing:  Hours


Duration:  Since onset


Prehospital treatment:  None


Location:  Substernal


Radiation:  No Radiation


Quality:  Pressure


Onset:  At Rest


Cardiac Risk Factors:  None


PE Risk Factors:  None


History of:  Similar pain in past


Modifying Factors:  Nothing


Associated Signs and Symptoms:  SOB





Past Medical History


PAST MEDICAL HISTORY:  Denies


Surgical History:  Denies all surgeries





Family History


Family History:  Reviewed,noncontributory to illness





Social History


Smoker:  Non-Smoker


Alcohol:  Denies ETOH Use


Drugs:  Denies Drug Use


Lives In:  Home





Constitutional:  denies: chills, diaphoresis, fatigue, fever, malaise, sweats, 

weakness, others


EENTM:  denies: blurred vision, double vision, ear bleeding, ear discharge, ear 

drainage, ear pain, ear ringing, eye pain, eye redness, hearing loss, mouth 

pain, mouth swelling, nasal discharge, nose bleeding, nose congestion, nose 

pain, photophobia, tearing, throat pain, throat swelling, voice changes, others


Respiratory:  reports: shortness of breath; denies: cough, hemoptysis, 

orthopnea, SOB at rest, SOB with excertion, stridor, wheezing, others


Cardiovascular:  reports: chest pain; denies: dizzy spells, diaphoresis, Dyspnea

on exertion, edema, irregular heart beat, left arm pain, lightheadedness, 

palpitations, PND, syncope, others


Gastrointestinal:  denies: abdomen distended, abdominal pain, blood streaked 

bowels, constipated, diarrhea, dysphagia, difficulty swallowing, hematemesis, 

melena, nausea, poor appetite, poor fluid intake, rectal bleeding, rectal pain, 

vomiting, others


Genitourinary:  denies: burning, dysuria, flank pain, frequency, hematuria, 

incontinence, penile discharge, penile sore, pain, testicle pain, testicle 

swelling, urgency, others


Neurological:  denies: dizziness, fainting, headache, left sided numbness, left 

sided weakness, numbness, paresthesia, pre-existing deficit, right sided 

numbness, right sided weakness, seizure, speech problems, tingling, tremors, 

weakness, others


Musculoskeletal:  denies: back pain, gout, joint pain, joint swelling, muscle 

pain, muscle stiffness, neck pain, others


Integumetry:  denies: bruises, change in color, change in hair/nails, dryness, 

laceration, lesions, lumps, rash, wounds, others


Allergic/Immunocompromised:  denies: Difficulty Healing, Frequent Infections, 

Hives, Itching, others


Hematologic/Lymphatic:  denies: anemia, blood clots, easy bleeding, easy 

bruising, swollen glands, others


Endocrine:  denies: excessive hunger, excessive sweating, excessive thirst, 

excessive urination, flushing, intolerance to cold, intolerance to heat, 

unexplained weight gain, unexplained weight loss, others


Psychiatric:  denies: anxiety, bipolar disorder, depression, hopeless, panic 

disorder, schizophrenia, sleepless, suicidal, others


All Other Systems:  Reviewed and Negative





Physical Exam


General Appearance:  Moderate Distress, Obese


HEENT:  Normal ENT Inspection, Pharynx Normal, TMs Normal


Neck:  Full Range of Motion, Non-Tender, Normal, Normal Inspection


Respiratory:  Chest Non-Tender, Lungs Clear, No Accessory Muscle Use, No Respira

tory Distress, Normal Breath Sounds


Cardiovascular:  No Edema, No JVD, No Murmur, No Gallop, Normal Peripheral 

Pulses, Regular Rate/Rhythm


Breast Exam:  Deferred


Gastrointestinal:  No Organomegaly, Non Tender, No Pulsatile Mass, Normal Bowel 

Sounds, Soft


Genitalia:  Deferred


Pelvic:  Deferred


Rectal:  Deferred


Extremities:  No calf tenderness, Normal capillary refill, Pedal edema


Musculoskeletal :  


   Apperance:  Normal


Neurologic:  Alert, CNs II-XII nml as Tested, No Motor Deficits, Normal Affect, 

Normal Mood, No Sensory Deficits


Cerebellar Function:  Normal


Reflexes:  Normal


Skin:  Dry, Normal Color, Warm


Lymphatic:  No Adenopathy





EKG


EKG :  


   Pulse Rate (adult):  111


   Axis:  Normal


   Cardiac Rhythm:  Afib


   Block:  RBBB


   Hypertrophy:  None


   ST:  Normal





Was a procedure done?


Was a procedure done?:  No





CP Differential Dx


Differential Diagnosis:  A-fib, Anxiety / Panic Attack


Differential Diagnosis:  Angina, Chest Wall Pain, Costochondritis, Esophageal 

reflux/spasm, Gastritis





X-Ray, Labs, Meds, VS





                                   Vital Signs








  Date Time  Temp Pulse Resp B/P (MAP) Pulse Ox O2 Delivery O2 Flow Rate FiO2


 


3/12/25 19:07  98  117/89    


 


3/12/25 18:53 98.6 87 18 117/89 (98) 90   





 98.6       


 


3/12/25 18:50  98      


 


3/12/25 16:50  106      


 


3/12/25 16:47  111      


 


3/12/25 16:10  111      


 


3/12/25 16:05 98.2 92 20 136/79 (98) 94   








                                       Lab








Test


 3/12/25


16:40 Range/Units


 


 


White Blood Count


 9.9 


 4.4-10.8


10^3/uL


 


Red Blood Count


 5.38 


 4.5-5.90


10^6/uL


 


Hemoglobin 15.4  13.5-17.5  g/dL


 


Hematocrit 46.5  41.0-53.0  %


 


Mean Corpuscular Volume 86.3  80.0-100.0  fL


 


Mean Corpuscular Hemoglobin 28.6  28.0-32.0  pg


 


Mean Corpuscular Hemoglobin


Concent 33.1 


 32.0-36.0  g/dL





 


Red Cell Distribution Width 16.2 H 11.8-14.3  %


 


Platelet Count


 241 


 140-450


10^3/uL


 


Mean Platelet Volume 9.5  6.9-10.8  fL


 


Neutrophils (%) (Auto) 57.9  37.0-80.0  %


 


Lymphocytes (%) (Auto) 33.0  10.0-50.0  %


 


Monocytes (%) (Auto) 7.0  0.0-12.0  %


 


Eosinophils (%) (Auto) 1.3  0.0-7.0  %


 


Basophils (%) (Auto) 0.8  0.0-2.0  %


 


Neutrophils # (Auto)


 5.7 


 1.6-8.6  10


^3/uL


 


Lymphocytes # (Auto)


 3.3 


 0.4-5.4  10


^3/uL


 


Monocytes # (Auto) 0.7  0-1.3  10 ^3/uL


 


Eosinophils # (Auto) 0.1  0-0.8  10 ^3/uL


 


Basophils # (Auto) 0.1  0-0.2  10 ^3/uL


 


Nucleated Red Blood Cells 0.2   %


 


Troponin I High Sensitivity 9  </=54  ng/L


 


B-Type Natriuretic Peptide 155.73  0-100  pg/mL








                               Current Medications








 Medications


  (Trade)  Dose


 Ordered  Sig/Hardy


 Route  Start Time


 Stop Time Status Last Admin


 


 Aspirin  162 mg  ONCE  ONCE


 PO  3/12/25 17:00


 3/12/25 17:01 DC 3/12/25 19:06





 


 Metoprolol


 Tartrate


  (Lopressor)  5 mg  ONCE  ONCE


 IV  3/12/25 17:00


 3/12/25 17:01 DC 3/12/25 19:07








The patient's CBC is within normal limits 


The BNP is 155.73 


The patient was given aspirin here in the emergency department's 


The patient was given metoprolol 5 mg IV push


At this time, the chest x-ray shows:  Cardiomegaly with some CHF 


The patient was being given Lasix 40 mg IV push 


The patient was being admitted to the hospitalist at this time 


A cardiology consult will be obtained.


Images Reviewed?:  Images reviewed and evaluated by me


Time of 1ST Reevaluation:  17:20


Reevaluation 1ST:  Unchanged


Patient Education/Counseling:  Diagnosis, Treatment, Prognosis


Family Education/Counseling:  No Family Present





Departure 1


Departure


Time of Disposition:  19:52


Impression:  


   Primary Impression:  


   Acute coronary syndrome


   Additional Impression:  


   Acute on chronic diastolic heart failure


Disposition:  09 ADMITTED AS INPATIENT


Admit to:  Tele


Condition:  Fair





Critical Care Note


Critical Care Time?:  Yes (45 min-critical care time only)





Stability


Stability form required:  Yes


Unstable for transfer:  Telemetry monitoring (Telemetry monitoring required), ED

Physician Assesment (Clinical assesment)





Heart Score


Heart Score:  








Heart Score Response (Comments) Value


 


History Highly Suspicious 2


 


EKG Repolarization Disturb 1


 


Age                                     45-64 1


 


Risk Factors                            1 or 2 risk factors 1


 


Troponin Normal limit 0


 


Total  5














I personally scribed for IZZY DICKENS MD ([email or web address]) on 3/12/25 at 16:47.  

Electronically submitted by Emily Reyes ([email or web address]).


I personally scribed for IZZY DICKENS MD ([email or web address]) on 3/12/25 at 16:53.  

Electronically submitted by Emily Reyes ([email or web address]).


I personally scribed for IZZY DICKENS MD ([email or web address]) on 3/12/25 at 17:08.  

Electronically submitted by Emily Reyes ([email or web address]).





IZZY DICKENS MD              Mar 12, 2025 16:47

## 2025-03-12 NOTE — ECG
[facility]

                                       

Test Date:    2025               Test Time:    16:10:43

Pat Name:     JOSE PAYNE            Department:   ER

Patient ID:   [id number]           Room:         0296T

Gender:       M                        Technician:   YUE

:          1975               Requested By: DEBRA LOREDO

Order Number: [id number]        Reading MD:   Agustin Ramírez

                                 Measurements

Intervals                              Axis          

Rate:         111                      P:            0

CT:           0                        QRS:          80

QRSD:         138                      T:            13

QT:           376                                    

QTc:          511                                    

                           Interpretive Statements

Atrial fibrillation

Right bundle branch block

Baseline wander in lead(s) V2,V3

Electronically Signed On 3- 19:00:46 PDT by Agustin Ramírez



Please click the below link to view image of tracing.

## 2025-03-12 NOTE — ECG
[facility]

                                       

Test Date:    2025               Test Time:    16:43:58

Pat Name:     JOSE PAYNE            Department:   ED

Patient ID:   [id number]           Room:         0296T

Gender:       M                        Technician:   IMAN

:          1975               Requested By: DEBRA LOREDO

Order Number: [id number]        Reading MD:   Agustin Ramírez

                                 Measurements

Intervals                              Axis          

Rate:         106                      P:            0

VT:           0                        QRS:          -4

QRSD:         125                      T:            21

QT:           347                                    

QTc:          461                                    

                           Interpretive Statements

Atrial fibrillation

Paired ventricular premature complexes

Aberrant conduction of SV complex(es)

Right bundle branch block

Electronically Signed On 3- 19:01:17 PDT by Agustin Ramírez



Please click the below link to view image of tracing.

## 2025-03-12 NOTE — ECG
[facility]

                                       

Test Date:    2025               Test Time:    18:48:39

Pat Name:     JOSE PAYNE            Department:   ED

Patient ID:   [id number]           Room:         0296T

Gender:       M                        Technician:   IMAN

:          1975               Requested By: DEBRA LOREDO

Order Number: [id number]        Reading MD:   Agustin Ramírez

                                 Measurements

Intervals                              Axis          

Rate:         98                       P:            0

NJ:           0                        QRS:          86

QRSD:         137                      T:            17

QT:           369                                    

QTc:          472                                    

                           Interpretive Statements

Atrial fibrillation

Ventricular premature complex

Right bundle branch block

Electronically Signed On 3- 19:03:15 PDT by Agustin Ramírez



Please click the below link to view image of tracing.

## 2025-03-12 NOTE — DVHHPRES
History of Present Illness


Resident Creating Document:  SYLVIA CABA


Reason for Visit:  chest pain, afib


History of Present Illness


Patient is a 49 year old male with a significant past medical history whose 

presented from his PCP's office due to chest pain and palpitations. Patient was 

recently discharged from the hospital where he was managed  for acute 

respiratory failure and CHF exacerbation. While in the PCP's office, he started 

having chest pain and palpitation. Pain was centrally located and reproducible 

with arm stretching or palpation. Pain does not radiate to his neck or jaw. He 

denies cough Patient admits to being compliant with his medications; however, he

missed his doses today. In the ED, he was mildly tachycardiac, troponin negative

( x3), and 12 lead ekg did not show Afib or arrhythmias. 








Pmhx: CHF, paroxysmal AFIB, NSTEMI


PSHX: NONE


SOCIAL history: Lives with family at home, unemployed


Fmhx: uncontributorty


medication: Entresto, eliquis, asprin


Past Medical History


See HPI





Review of Systems


Constitutional:  No: Fever, Chills, Sweats, Weakness, Malaise, Other


Eyes:  No: Pain, Vision change, Conjunctivae inflammation, Eyelid inflammation, 

Other, Redness


ENT:  No: Ear pain, Ear discharge, Nose pain, Nose discharge, Nose congestion, 

Mouth pain, Mouth swelling, Throat pain, Throat swelling, Other


Respiratory:  No: Cough, Dry, Shortness of breath, SOB with excertion, Wheezing,

Hemoptysis, Pleuritic Pain, Sputum, Wheezing, Other


Cardiovascular:  Chest Pain, Palpitations; No: Orthopnea, Paroxysmal Noc. 

Dyspnea, Edema, Lt Headedness, Other


Gastrointestinal:  No: Nausea, Vomiting, Abdominal Pain, Diarrhea, Constipation,

Melena, Hematochezia, Other


Genitourinary:  No Dysuria, No Frequency, No Incontinence, No Hematuria, No R

etention, No Other


Musculoskeletal:  No: other, neck pain, shoulder pain, arm pain, back pain, hand

pain, leg pain, foot pain


Skin:  No: Rash, Lesions, Jaundice, Bruising, Other


Neurological:  No: Weakness, Numbness, Incoordination, Change in speech, 

Confusion, Seizures, Other


Allergies:  


Coded Allergies:  


     NO KNOWN ALLERGIES (Unverified , 8/12/15)


Medications





                               Current Medications








 Medications  Dose


 Ordered  Sig/Hardy


 Route  Start Time


 Stop Time Status Last Admin


Dose Admin


 


 Nitroglycerin  0.4 mg  Q5MINP  PRN


 SL  3/12/25 22:00


   UNV  





 


 Morphine Sulfate  2 mg  Q30M  PRN


 IV  3/12/25 22:00


   UNV  





 


 Furosemide  40 mg  BIDD


 IV  3/13/25 06:00


   UNV  





 


 Apixaban  5 mg  BIDPC


 PO  3/13/25 09:00


   UNV  





 


 Aspirin  81 mg  DAILY


 PO  3/13/25 10:00


   UNV  





 


 Carvedilol  12.5 mg  Q12HR


 PO  3/12/25 22:00


   UNV  





 


 Sacubitril/


 Valsartan  1 tab  BID


 PO  3/12/25 22:00


   UNV  














Exam


Vital Signs





Vital Signs








  Date Time  Temp Pulse Resp B/P (MAP) Pulse Ox O2 Delivery O2 Flow Rate FiO2


 


3/12/25 21:06 97.3 77 14 138/92 (107) 93   





 97.3       








Exam


General Appearance: Morbidly obese man, Alert, Oriented X3, Cooperative, 

shortness of breath.


HEENT:  Atraumatic, PERRLA, EOMI, Mucous membrane moist/pink


Respiratory:  Crackles in the lower lobes bilateral


Cardiovascular:  Regular rate, Normal S1, Normal S2, No murmurs, no chest wall 

tenderness


Abdominal:   distention, no tender, bowel sounds present, no scars noted


Extremities:  No clubbing, No cyanosis, No edema, Normal pulses, No 

tenderness/swelling


Skin:  No rashes, No breakdown; dry skin


Neuro:  Normal gait, Normal speech, Strength at 5/5 X4 ext, Normal tone, 

Sensation intact, Cranial nerves 3-12 NL, Reflexes 2+


Psych/Mental Status:  Mental status NL, Mood NL


General Appearance:  Alert





Labs/Xrays





                                      Labs








Test


 3/12/25


21:20 3/12/25


20:20 3/12/25


16:40 Range/Units


 


 


Urine Color  Light-yellow   Yellow  


 


Urine Clarity  Clear   Clear  


 


Urine pH  6.5   5.0-9.0  


 


Urine Specific Gravity  1.009   1.001-1.035  


 


Urine Protein  Negative   Negative  


 


Urine Ketones  Negative   Negative  


 


Urine Blood  Negative   Negative  /uL


 


Urine Nitrite  Negative   Negative  


 


Urine Bilirubin  Negative   Negative  


 


Urine Urobilinogen  Normal   Negative  mg/dL


 


Urine Leukocyte Esterase  Trace   Negative  /uL


 


Urine RBC  1   0 - 3  /hpf


 


Urine Microscopic WBC  3   0-3  /HPF


 


Urine Squamous Epithelial


Cells 


 Few 


 


 <5  /hpf





 


Urine Bacteria  None seen   None Seen  /hpf


 


Urine Glucose  Normal   Normal  mg/dL


 


White Blood Count


 


 


 9.9 


 4.4-10.8


10^3/uL


 


Red Blood Count


 


 


 5.38 


 4.5-5.90


10^6/uL


 


Hemoglobin   15.4  13.5-17.5  g/dL


 


Hematocrit   46.5  41.0-53.0  %


 


Mean Corpuscular Volume   86.3  80.0-100.0  fL


 


Mean Corpuscular Hemoglobin   28.6  28.0-32.0  pg


 


Mean Corpuscular Hemoglobin


Concent 


 


 33.1 


 32.0-36.0  g/dL





 


Red Cell Distribution Width   16.2 H 11.8-14.3  %


 


Platelet Count


 


 


 241 


 140-450


10^3/uL


 


Mean Platelet Volume   9.5  6.9-10.8  fL


 


Neutrophils (%) (Auto)   57.9  37.0-80.0  %


 


Lymphocytes (%) (Auto)   33.0  10.0-50.0  %


 


Monocytes (%) (Auto)   7.0  0.0-12.0  %


 


Eosinophils (%) (Auto)   1.3  0.0-7.0  %


 


Basophils (%) (Auto)   0.8  0.0-2.0  %


 


Neutrophils # (Auto)


 


 


 5.7 


 1.6-8.6  10


^3/uL


 


Lymphocytes # (Auto)


 


 


 3.3 


 0.4-5.4  10


^3/uL


 


Monocytes # (Auto)   0.7  0-1.3  10 ^3/uL


 


Eosinophils # (Auto)   0.1  0-0.8  10 ^3/uL


 


Basophils # (Auto)   0.1  0-0.2  10 ^3/uL


 


Nucleated Red Blood Cells   0.2   %


 


Sodium Level   136  136-145  mmol/L


 


Potassium Level   5.0  3.5-5.1  mmol/L


 


Chloride Level   104    mmol/L


 


Carbon Dioxide Level   21  20-31  mmol/L


 


Anion Gap   11  5-15  


 


Blood Urea Nitrogen   10  9-23  mg/dL


 


Creatinine


 


 


 1.12 


 0.700-1.30


mg/dL


 


Glomerular Filtration Rate


Calc 


 


 80 


 >90  mL/min





 


BUN/Creatinine Ratio   8.9 L 10.0-20.0  


 


Serum Glucose   89    mg/dL


 


Calcium Level   9.5  8.7-10.4  mg/dL


 


Total Bilirubin   1.1 H 0.2-1.0  mg/dL


 


Aspartate Amino Transferase


(AST) 


 


 49 H


 13-40  U/L





 


Alanine Aminotransferase (ALT)   22  7-40  U/L


 


Alkaline Phosphatase   58    U/L


 


B-Type Natriuretic Peptide   155.73  0-100  pg/mL


 


Total Protein   8.8 H 5.7-8.2  g/dL


 


Albumin   4.3  3.2-4.8  g/dL











Assessment/Plan


Assessment/Plan


Assessment


Acute on chronic HFrEF, recent echo: 30%


Paroxysmal AFIB


Morbid obesity class III, BMI: 51.2


Recent history of  Acute hypoxic respiratory failure


Recent history of NSTEMI, cardiology cleared the patient to be discharged on 

beta blocker/Lasix and Eliquis


Cardiomyopathy rule out ischemia, patient is compliant, cardiology consult 


? Myocarditis given  the history of recent viral infection


Cardiomegaly 








PLAN


IV furosemide 40mg bid


Continue GDMT as can tolerate


Lovenox therapeutic dose


trend troponin


Oxygen 2L PRN


Morphine prn


Cardiology consult for probable ischemic workup





Diet: cardiac diet








Goal of care discussed for 30 minutes, full code


Case and plan discussed with Dr. Barron


Plan discussed with:  Patient


My Orders





                         Orders - SYLVIA CABA RESIDENT








Procedure Category Date Status





  Time 


 


Admit ADMIT 3/12/25 Transmitted





  21:51 


 


Nitroglycerin PHA 3/12/25 Logged





Sublingual (Ntrostat  22:00 


 


Morphine Sulfate PHA 3/12/25 Logged





Injection  22:00 


 


Oxygen By Nasal RT 3/12/25 Transmitted





Cannula  21:51 


 


Stat Ekg For Chest [facility] [place] 3/12/25 In Process





Pain  21:51 


 


Notify Md Of Changes JAQUELINE 3/12/25 In Process





From Base  21:51 


 


Telemetry Monitor For [facility] [place] 3/12/25 In Process





24 Hours  21:51 


 


Emergency Dysrhythmia [facility] [place] 3/12/25 In Process





Protocol  21:51 


 


Rhythm Strips Once [facility] [place] 3/12/25 In Process





Every Shift  21:51 


 


Drug Screen LAB 3/12/25 Logged





  21:51 


 


Complete Blood Count LAB 3/13/25 Verified





  04:00 


 


Thyroid Stimulating LAB 3/12/25 Logged





Hormone  21:51 


 


Basic Metabolic Panel LAB 3/13/25 Verified





  04:00 


 


Magnesium LAB 3/13/25 Verified





  04:00 


 


Hemoglobin A1c LAB 3/13/25 Verified





  04:00 


 


Furosemide Injection PHA 3/12/25 Logged





 (Lasix Injection)  22:00 


 


Furosemide Injection PHA 3/13/25 Logged





 (Lasix Injection)  06:00 


 


Oxygen ED NURSING 3/12/25 Transmitted





   


 


Apixaban (Eliquis) PHA 3/13/25 Logged





  09:00 


 


Aspirin Enteric PHA 3/13/25 Logged





Coated Tablet  10:00 


 


Carvedilol Tablet PHA 3/12/25 Logged





 (Coreg Tablet)  22:00 


 


Sacubitril-Valsartan PHA 3/12/25 Logged





 (Entresto 24-26 Mg  22:00 











Date of Service:  Mar 12, 2025


Billing Provider:  JOSELUIS BARRON MD


Common Visit Codes:  99223-INITIAL  INP/OBS CARE (HIGH)











SYLVIA CABA RESIDENT          Mar 12, 2025 22:03


JOSELUIS BARRON MD            Mar 13, 2025 11:30

## 2025-03-12 NOTE — DVH
XY CHEST TWO VIEWS ROUTINE



CLINICAL HISTORY: CP



COMPARISON: None



TECHNIQUE: Frontal and lateral view of the chest was obtained



FINDINGS: 



Lines and Tubes: None



Lungs: No focal consolidation.



Pleura: No effusion. No pneumothorax.



Cardiomediastinal contours: Cardiomegaly



Bones: No acute osseous abnormality.



IMPRESSION: 



Cardiomegaly with mild CHF



Electronically Signed by: Felipe Rivera at 03/12/2025 17:48:01 PM

## 2025-03-13 NOTE — DVHINCON2
AINSLEY JIMENEZ [facility] 3/13/25 1324:


Date Seen:  Mar 13, 2025


Referring Physician


MD Ana Paula





Reason for Consultation


Rule out ischemia





History of Present Illness


This is a 49-year-old male patient who presents to emergency room with chief 

complaint of shortness of breath.  The patient reports he was at his primary 

doctor's office (Dr. Paulino), and was told to come to the emergency room for 

further evaluation.  Previous provider records also mentioned chest pain.  When 

asked patient if he was having chest pain prior to emergency room arrival or any

time during this admission, the patient adamantly denies.  Upon emergency room 

arrival, a twelve lead electrocardiogram revealed atrial fibrillation with right

bundle branch block.  Troponin levels have been negative.  Cardiology has been 

consulted at this time for possible ischemic workup.  Significant past medical 

history includes congestive heart failure, atrial fibrillation (on Eliquis), and

morbid obesity.  The patient was recently seen and discharged from this facility

on 03/01/2025.  The patient denies undergoing any previous ischemic workup.





Past Medical History


Past medical history reviewed. No other significant than mentioned above.





Past Surgical History


Denies all surgeries





Family History:  


FH: kidney failure


  G8 MOTHER


Family History


Family history reviewed.


Social History


Denies the use of tobacco, alcohol or illicit drugs.





Allergies:  


Coded Allergies:  


     NO KNOWN ALLERGIES (Unverified , 8/12/15)


Home Meds


Active Scripts


Potassium Chloride (Klor-Con M10) 10 Meq Tab, 1 TAB PO DAILY, #30 TAB 5 Refills


   Prov:VIVEK LEE MD         2/27/25


Furosemide (Lasix) 40 Mg Tab, 40 MG PO DAILY, #60 TAB


   Prov:VIVEK LEE MD         2/27/25


Sacubitril-Valsartan (Entresto 24-26 mg) 1 Tab Tab, 1 TAB PO BID, #60 TAB


   Prov:VIVEK LEE MD         2/27/25


Carvedilol (COREG) 12.5 Mg Tab, 12.5 MG PO Q12HR, #60 TAB


   Prov:VIVEK LEE MD         2/27/25


Aspirin (Aspir-Low) 81 Mg Tab, 81 MG PO DAILY for 30 Days, #30 TAB


   Prov:VIVEK LEE MD         2/27/25


Apixaban Base (ELIQUIS) 5 Mg Tab, 5 MG PO BIDPC, #60 TAB


   Prov:VIVEK LEE MD         2/27/25


Home Meds


Home medications reviewed.


Current Medications





                               Current Medications








 Medications


  (Trade)  Dose


 Ordered  Sig/Hardy


 Route


 PRN Reason  Start Time


 Stop Time Status Last Admin


 


 Nitroglycerin


  (Ntrostat


 Sublingual)  0.4 mg  Q5MINP  PRN


 SL


 FOR CHEST PAIN  3/12/25 22:00


     





 


 Morphine Sulfate  2 mg  Q30M  PRN


 IV


 FOR CHEST PAIN  3/12/25 22:00


     





 


 Furosemide


  (Lasix Injection)  40 mg  BIDD


 IV


   3/13/25 06:00


    3/13/25 05:31





 


 Apixaban


  (Eliquis)  5 mg  BIDPC


 PO


   3/13/25 09:00


 3/12/25 22:45 DC  





 


 Aspirin


  (Ecotrin Enteric


 Coated Tablet)  81 mg  DAILY


 PO


   3/13/25 10:00


    3/13/25 09:58





 


 Carvedilol


  (Coreg Tablet)  12.5 mg  Q12HR


 PO


   3/12/25 22:00


    3/13/25 09:57





 


 Sacubitril/


 Valsartan


  (Entresto 24-26


 Mg tab)  1 tab  BID


 PO


   3/12/25 22:00


    3/13/25 09:57





 


 Enoxaparin Sodium


  (Lovenox)  150 mg  Q12HR


 SC


   3/13/25 10:00


    3/13/25 09:58





 


 Spironolactone


  (Aldactone)  25 mg  DAILY


 PO


   3/13/25 10:00


    3/13/25 09:57





 


 Empaglifozin


  (Jardiance)  10 mg  DAILY


 PO


   3/13/25 10:00


     














Review of Systems


Constitutional:  No symptom reported


Ears, Nose, & Throat:  No symptom reported


Eyes:  No symptom reported


Neurological: No symptoms reported


Pulmonary/Respiratory:  Shortness of breath


Cardiovascular: No symptom reported


Gastrointestinal:  No symptom reported


Genitourinary:  No symptom reported


Musculoskeletal:  No symptom reported


Skin:  No symptom reported


Psychiatric:  No symptom reported


Endocrine:  No symptom reported


Hematologic/Lymphatic:  No symptom reported





Vital Signs





                                   Vital Signs








  Date Time  Temp Pulse Resp B/P (MAP) Pulse Ox O2 Delivery O2 Flow Rate FiO2


 


3/13/25 11:00  84  124/74    


 


3/13/25 09:00 98.6  20  95   





 98.6       


 


3/13/25 08:10      Room Air* 0 21








Physical Exam


General Appearance: Cooperative.  Morbidly obese


Pulmonary/Respiratory: Clear, bilateral breaths sounds. 


Cardiovascular/Chest: Irregular rate and rhythm.  


Peripheral Pulses:  2+ Radial (R). 2+ Radial (L)


Abdominal Exam:  Normal bowel sounds.


Ankle Exam: Negative ankle edema


Lower extremities: Negative lower extremity edema


Neuro/Mental Status: A/OX4, coherent. 


Thoughts/Psych: Normal thought pattern. Appropriate mood and affect. Good 

judgment and insight. 


Appearance: No acute distress. 


Skin Exam:  Normal inspection.  Normal color. Warm and dry.





Labs/Diagnostic Data





                                      Labs








Test


 3/13/25


06:51 3/12/25


21:20 3/12/25


20:20 3/12/25


16:40 Range/Units


 


 


White Blood Count


 10.2 


 


 


 


 4.4-10.8


10^3/uL


 


Red Blood Count


 5.43 


 


 


 


 4.5-5.90


10^6/uL


 


Hemoglobin 15.2     13.5-17.5  g/dL


 


Hematocrit 46.8     41.0-53.0  %


 


Mean Corpuscular Volume 86.2     80.0-100.0  fL


 


Mean Corpuscular Hemoglobin 28.0     28.0-32.0  pg


 


Mean Corpuscular Hemoglobin


Concent 32.5 


 


 


 


 32.0-36.0  g/dL





 


Red Cell Distribution Width 16.0 H    11.8-14.3  %


 


Platelet Count


 213 


 


 


 


 140-450


10^3/uL


 


Mean Platelet Volume 9.8     6.9-10.8  fL


 


Neutrophils (%) (Auto) 67.2     37.0-80.0  %


 


Lymphocytes (%) (Auto) 25.2     10.0-50.0  %


 


Monocytes (%) (Auto) 6.2     0.0-12.0  %


 


Eosinophils (%) (Auto) 0.9     0.0-7.0  %


 


Basophils (%) (Auto) 0.5     0.0-2.0  %


 


Neutrophils # (Auto)


 6.9 


 


 


 


 1.6-8.6  10


^3/uL


 


Lymphocytes # (Auto)


 2.6 


 


 


 


 0.4-5.4  10


^3/uL


 


Monocytes # (Auto) 0.6     0-1.3  10 ^3/uL


 


Eosinophils # (Auto) 0.1     0-0.8  10 ^3/uL


 


Basophils # (Auto) 0     0-0.2  10 ^3/uL


 


Nucleated Red Blood Cells 0.2      %


 


Prothrombin Time 12.4 H    9.3-11.8  sec


 


Prothrombin Time INR 1.19 H    0.9-1.15  


 


Activated Partial


Thromboplast Time 31.9 


 


 


 


 24.5-34.5  SEC





 


Sodium Level 138     136-145  mmol/L


 


Potassium Level 3.6     3.5-5.1  mmol/L


 


Chloride Level 99       mmol/L


 


Carbon Dioxide Level 30     20-31  mmol/L


 


Anion Gap 9     5-15  


 


Blood Urea Nitrogen 13     9-23  mg/dL


 


Creatinine


 1.04 


 


 


 


 0.700-1.30


mg/dL


 


Glomerular Filtration Rate


Calc 87 


 


 


 


 >90  mL/min





 


BUN/Creatinine Ratio 12.5     10.0-20.0  


 


Serum Glucose 89       mg/dL


 


Hemoglobin A1c 6.1 H    <5.7  % A1C


 


Calcium Level 9.3     8.7-10.4  mg/dL


 


Magnesium Level 2.2     1.6-2.6  mg/dL


 


Troponin I High Sensitivity  8    </=54  ng/L


 


Urine Color   Light-yellow   Yellow  


 


Urine Clarity   Clear   Clear  


 


Urine pH   6.5   5.0-9.0  


 


Urine Specific Gravity   1.009   1.001-1.035  


 


Urine Protein   Negative   Negative  


 


Urine Ketones   Negative   Negative  


 


Urine Blood   Negative   Negative  /uL


 


Urine Nitrite   Negative   Negative  


 


Urine Bilirubin   Negative   Negative  


 


Urine Urobilinogen   Normal   Negative  mg/dL


 


Urine Leukocyte Esterase   Trace   Negative  /uL


 


Urine RBC   1   0 - 3  /hpf


 


Urine Microscopic WBC   3   0-3  /HPF


 


Urine Squamous Epithelial


Cells 


 


 Few 


 


 <5  /hpf





 


Urine Bacteria   None seen   None Seen  /hpf


 


Urine Glucose   Normal   Normal  mg/dL


 


Urine Opiates Screen   Neg   NEGATIVE  


 


Urine Fentanyl Screen   Neg   NEGATIVE  


 


Urine Barbiturates Screen   Neg   NEGATIVE  


 


Urine Phencyclidine Screen   Neg   NEGATIVE  


 


Urine Amphetamines Screen   Neg   NEGATIVE  


 


Urine Benzodiazepines Screen   Neg   NEGATIVE  


 


Urine Cocaine Screen   Neg   NEGATIVE  


 


Urine Cannabinoids Screen   Neg   NEGATIVE  


 


Total Bilirubin    1.1 H 0.2-1.0  mg/dL


 


Aspartate Amino Transferase


(AST) 


 


 


 49 H


 13-40  U/L





 


Alanine Aminotransferase (ALT)    22  7-40  U/L


 


Alkaline Phosphatase    58    U/L


 


B-Type Natriuretic Peptide    155.73  0-100  pg/mL


 


Total Protein    8.8 H 5.7-8.2  g/dL


 


Albumin    4.3  3.2-4.8  g/dL


 


Thyroid Stimulating Hormone


(TSH) 


 


 


 2.05 


 0.55-4.78


uIU/mL








                                  Microbiology








 Date/Time


Source Procedure


Growth Status





 


 3/12/25 22:05


Nose MRSA Screen - Final


 Complete











Assessment


Shortness of breath, rule out coronary artery disease


Acute on chronic HFrEF, NYHA class III


Atrial fibrillation (on Eliquis)


Morbid obesity


Plan/Recommendation


We will continue with the following plan/recommendations (Dr. Christianson):





* Transthoracic echocardiogram from 02/22/2025 reveals EF 30%


* Initiate guideline directed medical therapy for CHF as tolerated


* Strict intake and output, daily weights, maintain fluid restriction


* Diuresis as tolerated


* [id number] VASc score: 1 point


   * Therapeutic Lovenox while inpatient, transition back to NOAC when 

     appropriate


   * Beta-blocker for rate control


   * Avoid antiarrhythmic agent given unknown duration of AFib


* Close Cardiac surveillance





Patient seen and examined at bedside with .  We will initiate the 

patient on guideline directed medical therapy for CHF as tolerated.  Given the 

patient's newly diagnosed congestive heart failure with reduced ejection 

fraction, the patient may qualify for ischemic workup.  At the time of 

assessment, bed scale used to weigh patient who is at 203.5kg per bedscale.  The

patient may need to be transferred to higher level of care for ischemic workup 

given that our facility uses cardiac catheterization equipment that can only 

hold up to 350lbs.  In the meantime continue with GDMT.  The patient may also 

qualify for LifeVest prior to discharge. Thank you for allowing us to care for 

this patient. Please call with any questions or concerns.  Critical care time 

spent: 42 minutes





This medical document was created using an electronic medical record system with

voice recognition software and computerized dictation system.  Although this 

document has been carefully reviewed, there might still be some phonetic and 

typographical errors.  Occasional wrong-word or ``sound-alike substitutions 

may have occurred due to the inherent limitations of voice recognition software.

 These areas are purely typographical due to imperfections of the software 

programs and do not reflect any compromise in the patient's medical care.  

Please read the chart carefully and recognize, using context, where these 

substitutions have occurred.


Plan discussed with:  Patient


NYHA








Physical activity limitations: Class3(Marked) ordinary











Date of Service:  Mar 13, 2025


Billing Provider:  AINSLEY JIMENEZ


Cardiology Common Codes:  99223-INITIAL  INP/OBS CARE (High)


Cardiology Consultation Codes:  [id number] CONSULT <45MIN





VIRGINIA CHRISTIANSON DO 3/13/25 2156:


Date Seen:  Mar 13, 2025





Family History:  


FH: kidney failure


  G8 MOTHER





Allergies:  


Coded Allergies:  


     NO KNOWN ALLERGIES (Unverified , 8/12/15)


Home Meds


Active Scripts


Potassium Chloride (Klor-Con M10) 10 Meq Tab, 1 TAB PO DAILY, #30 TAB 5 Refills


   Prov:VIVEK LEE MD         2/27/25


Furosemide (Lasix) 40 Mg Tab, 40 MG PO DAILY, #60 TAB


   Prov:VIVEK LEE MD         2/27/25


Sacubitril-Valsartan (Entresto 24-26 mg) 1 Tab Tab, 1 TAB PO BID, #60 TAB


   Prov:VIVEK LEE MD         2/27/25


Carvedilol (COREG) 12.5 Mg Tab, 12.5 MG PO Q12HR, #60 TAB


   Prov:VIVEK LEE MD         2/27/25


Aspirin (Aspir-Low) 81 Mg Tab, 81 MG PO DAILY for 30 Days, #30 TAB


   Prov:VIVEK LEE MD         2/27/25


Apixaban Base (ELIQUIS) 5 Mg Tab, 5 MG PO BIDPC, #60 TAB


   Prov:VIVEK LEE MD         2/27/25





Plan/Recommendation


The patient was seen and examined with Ainsley Jimenez NP. I agree with her 

Assessment and Plan which was formulated with me.


Plan discussed with:  Patient





Date of Service:  Mar 13, 2025


Billing Provider:  VIRGINIA CHRISTIANSON DO


Cardiology Common Codes:  99223-INITIAL  INP/OBS CARE (High)











AINSLEY JIMENEZ             Mar 13, 2025 13:24


VIRGINIA CHRISTIANSON DO            Mar 13, 2025 21:56

## 2025-03-13 NOTE — DVHPNRES
Progress Note


Date Seen:  Mar 13, 2025


Resident Creating Document:  CARO ROD RESIDENT


Medical Necessity Reason


Pt with a Central, PICC or Fol:  No





Subjective


Review of Systems


Benjamin Shearer is a 49-year-old male with a PMH of newly diagnosed CHF, AFib 

presented the ED with the chief complaints palpitations and chest pain which 

started on the day of admission.  Patient reported that he recently discharged 

from this facility after being diagnosed CHF and AFib and yesterday he went to 

follow up his PCP office then he started having palpitations and difficulty in 

catching breath along with chest pain which is substernal, reproducible with 

almost touching and no radiation or aggravating or relieving factors.  Patient 

reported he missed his medications on the day of admission.  On my assessment 

patient denies fever, nausea, vomiting, diaphoresis, dizziness, other associated

symptoms 





PMH:  CHF and AFib diagnosed recently


PSH:  Denies 


Social history:  Lives at home.  Denies smoking, alcohol and other drug abuse 


Family history:  Negative 


Allergies:  No known allergies 


Home medications:  Entresto, Eliquis, aspirin





Patient seen and examined at the bedside.  Patient reported improvement in his 

symptoms since admission, no new complaints reported except palpitations.  EKG 

showed AFib but no ST changes.  Cardiology consulted for possible ischemic 

workup.





Objective


vital signs





                                   Vital Sign








  Date Time  Temp Pulse Resp B/P (MAP) Pulse Ox O2 Delivery O2 Flow Rate FiO2


 


3/13/25 09:57  91  131/78    


 


3/13/25 09:00 98.6  20  95   





 98.6       


 


3/13/25 08:10      Room Air* 0 21














                           Total Intake and Output   


 


 3/12/25 3/12/25 3/13/25





 15:00 23:00 07:00


 


Intake Total   0 ml


 


Balance   0 ml








medications





                               Current Medications








 Medications  Dose


 Ordered  Sig/Hardy


 Route  Start Time


 Stop Time Status Last Admin


Dose Admin


 


 Nitroglycerin  0.4 mg  Q5MINP  PRN


 SL  3/12/25 22:00


     





 


 Morphine Sulfate  2 mg  Q30M  PRN


 IV  3/12/25 22:00


     





 


 Furosemide  40 mg  BIDD


 IV  3/13/25 06:00


    3/13/25 05:31


40 MG


 


 Aspirin  81 mg  DAILY


 PO  3/13/25 10:00


    3/13/25 09:58


81 MG


 


 Carvedilol  12.5 mg  Q12HR


 PO  3/12/25 22:00


    3/13/25 09:57


12.5 MG


 


 Sacubitril/


 Valsartan  1 tab  BID


 PO  3/12/25 22:00


    3/13/25 09:57


1 TAB


 


 Enoxaparin Sodium  150 mg  Q12HR


 SC  3/13/25 10:00


    3/13/25 09:58


150 MG


 


 Spironolactone  25 mg  DAILY


 PO  3/13/25 10:00


    3/13/25 09:57


25 MG


 


 Empaglifozin  10 mg  DAILY


 PO  3/13/25 10:00


     











Examination


Pt is lying on bed





General Appearance:  Alert, Oriented X3, Cooperative, Not in acute distress


HEENT:  Atraumatic, Mucous membranes moist/pink


Respiratory:  Clear to auscultation, Normal air movement,Crackles in the lower 

lobes bilateral


Cardiovascular:  Regular rate, Normal S1, Normal S2, No murmurs


Abdominal:   Active bowel sounds, Soft, no distention, no tenderness


Extremities:  No edema, Normal pulses, No tenderness/swelling


Skin:  No   Significant rash, except past surgical scars


Neuro:  Normal speech,  sensorimotor deficits none


Psych/Mental Status:  Mental status NL, Mood NL


Nurse was there as sharperone during examination


laboratory and microbiology


                                Laboratory Tests


3/13/25 06:51

















Test


 3/13/25


06:51 Range/Units


 


 


Serum Glucose 89    mg/dL








                                  Microbiology








 Date/Time


Source Procedure


Growth Status





 


 3/12/25 22:05


Nose MRSA Screen - Final


 Complete








Labs and/or images reviewed:  Labs reviewed by me, Image(s) reviewed by me





Problem List/Assessment/Plan


Problem List/Assessment/Plan


# Chest pain R/o ACS


- troponins x3 were negative


- reviewed EKG showed AFib but no ST changes


- consulted cardiology for ischemic workup


- ordered chest pain protocol





# Acute on chronic HFrEF


- recent echo showed EF 30% with LVH


- GDM T as tolerated Jardiance, Aldactone, Entresto, Coreg


- Lasix IV 40 mg b.i.d.


- Cardiology consult for probable ischemic workup





# Afib with RVR  with a secondary hypercoagulable state


- monitoring on telemetry


- EKG showed AFib with RVR but no ST changes


- currently on therapeutic Lovenox bid





# morbid obesity with a BMI 51.2


- nutritional counseling


- counseled regarding lifestyle modifications including diet and exercise








GI ppx:  Not indicated 


VTE PPX:  Currently on therapeutic Lovenox


Diet:  Cardiac 


Home medications reconciled 





Goals of care discussed with the patient for more than 29 minutes:  Full code 

status 


Case discussed with Dr. Silveira, patient and nurse


Plan discussed with:  Patient





Date of Service:  Mar 13, 2025


Billing Provider:  MARTHA ANAYA MD


Common Visit Codes:  [id number] INP/OBS CARE(HIGH)











CARO ROD RESIDENT            Mar 13, 2025 12:24


MARTHA ANAYA MD           Mar 16, 2025 23:45

## 2025-03-14 NOTE — DVHPN2
Consult Progress Note


Subjective


Other Systems:  


Patient denies any cardiac symptoms at time of assessment





Objective


vital signs





                                   Vital Sign








  Date Time  Temp Pulse Resp B/P (MAP) Pulse Ox O2 Delivery O2 Flow Rate FiO2


 


3/14/25 18:09    113/88    


 


3/14/25 17:00 97.4 60 18  100   





 97.4       


 


3/14/25 08:00      Nasal Cannula* 3 32














                           Total Intake and Output   


 


 3/13/25 3/13/25 3/14/25





 15:00 23:00 07:00


 


Intake Total  800 ml 600 ml


 


Output Total   925 ml


 


Balance  800 ml -325 ml








medications





                               Current Medications








 Medications  Dose


 Ordered  Sig/Hardy


 Route  Start Time


 Stop Time Status Last Admin


Dose Admin


 


 Nitroglycerin  0.4 mg  Q5MINP  PRN


 SL  3/12/25 22:00


     





 


 Morphine Sulfate  2 mg  Q30M  PRN


 IV  3/12/25 22:00


     





 


 Furosemide  40 mg  BIDD


 IV  3/13/25 06:00


    3/14/25 18:09


40 MG


 


 Aspirin  81 mg  DAILY


 PO  3/13/25 10:00


    3/14/25 10:42


81 MG


 


 Carvedilol  12.5 mg  Q12HR


 PO  3/12/25 22:00


    3/13/25 21:22


12.5 MG


 


 Sacubitril/


 Valsartan  1 tab  BID


 PO  3/12/25 22:00


    3/14/25 10:42


1 TAB


 


 Enoxaparin Sodium  150 mg  Q12HR


 SC  3/13/25 10:00


    3/14/25 10:42


150 MG


 


 Spironolactone  25 mg  DAILY


 PO  3/13/25 10:00


    3/13/25 09:57


25 MG


 


 Empaglifozin  10 mg  DAILY


 PO  3/13/25 10:00


    3/14/25 10:42


10 MG








Examination:  GENERAL:Normal, LUNGS:Normal, CVS:Normal, NEURO:Normal


laboratory and microbiology


                                Laboratory Tests


3/14/25 04:49

















Test


 3/14/25


04:49 Range/Units


 


 


Serum Glucose 97    mg/dL











Problem List/Assessment/Plan


Problem List/Assessment/Plan


Shortness of breath, rule out coronary artery disease


Acute on chronic HFrEF, NYHA class III


Atrial fibrillation (on Eliquis)


Morbid obesity








Plan/Recommendation


We will continue with the following plan/recommendations (Dr. Hernandez):





* Transthoracic echocardiogram from 02/22/2025 reveals EF 30%


* Initiate guideline directed medical therapy for CHF as tolerated


* Strict intake and output, daily weights, maintain fluid restriction


* Diuresis as tolerated


* [id number] VASc score: 1 point


   * Therapeutic Lovenox while inpatient, transition back to NOAC when 

     appropriate


   * Beta-blocker for rate control


   * Avoid antiarrhythmic agent given unknown duration of AFib


* Close Cardiac surveillance





Patient seen and examined at bedside with .  Continue on guideline 

directed medical therapy for CHF as tolerated.  Given the patient's newly 

diagnosed congestive heart failure with reduced ejection fraction, the patient 

may qualify for ischemic workup.  At the time of assessment, bed scale used to 

weigh patient who is at 203.5kg per bedscale.  The patient may need to be 

transferred to higher level of care for ischemic workup given that our facility 

uses cardiac catheterization equipment that can only hold up to 350lbs.  In the 

meantime continue with GDMT.   arranging for transfer to higher 

level of care for ischemic workup.  Thank you for allowing us to care for this 

patient. Please call with any questions or concerns.  





This medical document was created using an electronic medical record system with

voice recognition software and computerized dictation system.  Although this 

document has been carefully reviewed, there might still be some phonetic and 

typographical errors.  Occasional wrong-word or ``sound-alike substitutions 

may have occurred due to the inherent limitations of voice recognition software.

 These areas are purely typographical due to imperfections of the software 

programs and do not reflect any compromise in the patient's medical care.  

Please read the chart carefully and recognize, using context, where these 

substitutions have occurred.


Plan discussed with:  Patient





Date of Service:  Mar 14, 2025


Billing Provider:  AINSLEY PRO


Common Visit Codes:  [id number] INP/OBS CARE(HIGH)











AINSLEY PRO             Mar 14, 2025 19:45

## 2025-03-14 NOTE — DVHPN2
Consult Progress Note


Subjective


Other Systems:  


Patient was seen and evaluated in follow up. Patient denies any cardiac


symptoms. Patient is on 3 LPM NC. Chemistry panel is WNL. Patient is


pending


[facility] transfer. 


Telemetry reviewed.





Objective


vital signs





                                   Vital Sign








  Date Time  Temp Pulse Resp B/P (MAP) Pulse Ox O2 Delivery O2 Flow Rate FiO2


 


3/14/25 21:34    101/69    


 


3/14/25 21:00 97.7 78 18  97   





 97.7       


 


3/14/25 20:00      Nasal Cannula* 3 32














                           Total Intake and Output   


 


 3/13/25 3/13/25 3/14/25





 14:59 22:59 06:59


 


Intake Total  800 ml 600 ml


 


Output Total   925 ml


 


Balance  800 ml -325 ml








medications





                               Current Medications








 Medications  Dose


 Ordered  Sig/Hardy


 Route  Start Time


 Stop Time Status Last Admin


Dose Admin


 


 Nitroglycerin  0.4 mg  Q5MINP  PRN


 SL  3/12/25 22:00


     





 


 Morphine Sulfate  2 mg  Q30M  PRN


 IV  3/12/25 22:00


     





 


 Furosemide  40 mg  BIDD


 IV  3/13/25 06:00


    3/14/25 18:09


40 MG


 


 Aspirin  81 mg  DAILY


 PO  3/13/25 10:00


    3/14/25 10:42


81 MG


 


 Carvedilol  12.5 mg  Q12HR


 PO  3/12/25 22:00


    3/13/25 21:22


12.5 MG


 


 Sacubitril/


 Valsartan  1 tab  BID


 PO  3/12/25 22:00


    3/14/25 21:36


1 TAB


 


 Enoxaparin Sodium  150 mg  Q12HR


 SC  3/13/25 10:00


    3/14/25 21:36


150 MG


 


 Spironolactone  25 mg  DAILY


 PO  3/13/25 10:00


    3/13/25 09:57


25 MG


 


 Empaglifozin  10 mg  DAILY


 PO  3/13/25 10:00


    3/14/25 10:42


10 MG








Examination:  GENERAL:Normal, HEENT:Normal, NECK:Normal, LUNGS:Normal, 

CVS:Normal, ABDOMEN:Normal, NEURO:Normal


laboratory and microbiology


                                Laboratory Tests


3/14/25 04:49

















Test


 3/14/25


04:49 Range/Units


 


 


Serum Glucose 97    mg/dL











Problem List/Assessment/Plan


Problem List/Assessment/Plan


Shortness of breath, rule out coronary artery disease.


Acute on chronic HFrEF, NYHA class III.


Atrial fibrillation (on Eliquis).


Morbid obesity.





Plan/Recommendation


Continued all current supportive medical care.   


Patient has been seen by Radha Jimenez NP on my behalf, her and I discussed the

plan with the patient. 


Continue on guideline directed medical therapy for CHF as tolerated.  


Given the patient's newly diagnosed congestive heart failure with reduced 

ejection fraction, the patient may qualify for ischemic workup.  


At the time of assessment, bed scale used to weigh patient who is at 203.5kg per

bed scale. The patient may need to be transferred to higher level of care for 

ischemic workup given that our facility uses cardiac catheterization equipment 

that can only hold up to 350lbs.  In the meantime continue with GDMT. Social 

Services arranging for transfer to higher level of care for ischemic workup.  


Transthoracic echocardiogram from 02/22/2025 reveals EF 30%.


Strict intake and output, daily weights, maintain fluid restriction.


Diuresis as tolerated.


[id number] VASc score: 1 point.


Therapeutic Lovenox while inpatient, transition back to NOAC when appropriate.


Beta-blocker for rate control.


Avoid antiarrhythmic agent given unknown duration of AFib.


Close Cardiac surveillance. 


Additional plan as per the hospital course.


Plan discussed with:  Patient





Date of Service:  Mar 14, 2025


Billing Provider:  SPENCER CHAPA MD


Cardiology Common Codes:  [id number] HOSP CARE(High











SPENCER CHAPA MD       Mar 14, 2025 22:22

## 2025-03-14 NOTE — DVHPNRES
Progress Note


Date Seen:  Mar 14, 2025


Resident Creating Document:  CARO ROD RESIDENT


Medical Necessity Reason


Pt with a Central, PICC or Fol:  No





Subjective


Review of Systems


Benjamin Shearer is a 49-year-old male with a PMH of newly diagnosed CHF, AFib 

presented the ED with the chief complaints palpitations and chest pain which 

started on the day of admission. 


Patient seen and examined at the bedside.  Patient reported improvement in his 

symptoms since admission, no new complaints reported.





Objective


vital signs





                                   Vital Sign








  Date Time  Temp Pulse Resp B/P (MAP) Pulse Ox O2 Delivery O2 Flow Rate FiO2


 


3/14/25 12:48 97.4 72 20 123/81 (95) 94   





 97.4       


 


3/13/25 20:00      Nasal Cannula* 3 32














                           Total Intake and Output   


 


 3/13/25 3/13/25 3/14/25





 15:00 23:00 07:00


 


Intake Total  800 ml 600 ml


 


Output Total   925 ml


 


Balance  800 ml -325 ml








medications





                               Current Medications








 Medications  Dose


 Ordered  Sig/Hardy


 Route  Start Time


 Stop Time Status Last Admin


Dose Admin


 


 Nitroglycerin  0.4 mg  Q5MINP  PRN


 SL  3/12/25 22:00


     





 


 Morphine Sulfate  2 mg  Q30M  PRN


 IV  3/12/25 22:00


     





 


 Furosemide  40 mg  BIDD


 IV  3/13/25 06:00


    3/14/25 05:12


40 MG


 


 Aspirin  81 mg  DAILY


 PO  3/13/25 10:00


    3/14/25 10:42


81 MG


 


 Carvedilol  12.5 mg  Q12HR


 PO  3/12/25 22:00


    3/13/25 21:22


12.5 MG


 


 Sacubitril/


 Valsartan  1 tab  BID


 PO  3/12/25 22:00


    3/14/25 10:42


1 TAB


 


 Enoxaparin Sodium  150 mg  Q12HR


 SC  3/13/25 10:00


    3/14/25 10:42


150 MG


 


 Spironolactone  25 mg  DAILY


 PO  3/13/25 10:00


    3/13/25 09:57


25 MG


 


 Empaglifozin  10 mg  DAILY


 PO  3/13/25 10:00


    3/14/25 10:42


10 MG








Examination


Pt is lying on bed





General Appearance:  Alert, Oriented X3, Cooperative, Not in acute distress


HEENT:  Atraumatic, Mucous membranes moist/pink


Respiratory:  Clear to auscultation, Normal air movement,Crackles in the lower 

lobes bilateral


Cardiovascular:  Regular rate, Normal S1, Normal S2, No murmurs


Abdominal:   Active bowel sounds, Soft, no distention, no tenderness


Extremities:  No edema, Normal pulses, No tenderness/swelling


Skin:  No   Significant rash, except past surgical scars


Neuro:  Normal speech,  sensorimotor deficits none


Psych/Mental Status:  Mental status NL, Mood NL


Nurse was there as sharperone during examination


laboratory and microbiology


                                Laboratory Tests


3/14/25 04:49

















Test


 3/14/25


04:49 Range/Units


 


 


Serum Glucose 97    mg/dL








                                  Microbiology








 Date/Time


Source Procedure


Growth Status





 


 3/12/25 22:05


Nose MRSA Screen - Final


 Complete








Labs and/or images reviewed:  Labs reviewed by me, Image(s) reviewed by me





Problem List/Assessment/Plan


Problem List/Assessment/Plan


# Chest pain R/o ACS


- troponins x3 were negative


- reviewed EKG showed AFib but no ST changes


- consulted cardiology for ischemic workup


- ordered chest pain protocol





# Acute on chronic HFrEF


- recent echo showed EF 30% with LVH


- GDM T as tolerated Jardiance, Aldactone, Entresto, Coreg


- Lasix IV 40 mg b.i.d.


- Cardiology consult for probable ischemic workup





# Afib with RVR  with a secondary hypercoagulable state


- monitoring on telemetry


- EKG showed AFib with RVR but no ST changes


- currently on therapeutic Lovenox bid





# morbid obesity with a BMI 51.2


- nutritional counseling


- counseled regarding lifestyle modifications including diet and exercise








GI ppx:  Not indicated 


VTE PPX:  Currently on therapeutic Lovenox


Diet:  Cardiac 


Home medications reconciled 





Goals of care discussed with the patient for more than 29 minutes:  Full code 

status 


Case discussed with Dr. Silveira, patient and nurse. The patient may need to be 

transferred to higher level of care for ischemic workup given that our facility 

uses cardiac catheterization equipment that can only hold up to 350lbs.


Plan discussed with:  Patient





My Orders


My Orders





                          Orders - CARO ROD








Procedure Category Date Status





  Time 


 


*  CONS 3/14/25 Transmitted





Consult   


 


Covid19 Antigen [facility] 3/14/25 In Process





   











Date of Service:  Mar 14, 2025


Billing Provider:  MARTHA ANAYA MD


Common Visit Codes:  [id number] INP/OBS CARE(HIGH)











CARO ROD            Mar 14, 2025 15:56


MARTHA ANAYA MD           Mar 16, 2025 23:56

## 2025-03-15 NOTE — DVHDSRES
Discharge Summary


Date of Admission


Resident Creating Document:  CARO ROD RESIDENT


Mar 12, 2025 at 21:51





Date of Discharge:


Mar 14, 2025





Admitting Diagnosis


Chest pain





Labs/Diagnostic Data:





                               Laboratory Results








Test


 3/14/25


13:36 3/14/25


04:49 3/13/25


06:51 3/12/25


21:20


 


SARS-CoV-2 Antigen (Rapid)


 Negative


(NEGATIVE) 


 


 





 


White Blood Count


 


 8.4 10^3/uL


(4.4-10.8) 


 





 


Red Blood Count


 


 5.15 10^6/uL


(4.5-5.90) 


 





 


Hemoglobin


 


 15.0 g/dL


(13.5-17.5) 


 





 


Hematocrit


 


 44.2 %


(41.0-53.0) 


 





 


Mean Corpuscular Volume


 


 85.7 fL


(80.0-100.0) 


 





 


Mean Corpuscular Hemoglobin


 


 29.1 pg


(28.0-32.0) 


 





 


Mean Corpuscular Hemoglobin


Concent 


 34.0 g/dL


(32.0-36.0) 


 





 


Red Cell Distribution Width


 


 15.8 %


(11.8-14.3) 


 





 


Platelet Count


 


 207 10^3/uL


(140-450) 


 





 


Mean Platelet Volume


 


 9.6 fL


(6.9-10.8) 


 





 


Neutrophils (%) (Auto)


 


 56.4 %


(37.0-80.0) 


 





 


Lymphocytes (%) (Auto)


 


 33.6 %


(10.0-50.0) 


 





 


Monocytes (%) (Auto)


 


 6.7 %


(0.0-12.0) 


 





 


Eosinophils (%) (Auto)


 


 2.8 %


(0.0-7.0) 


 





 


Basophils (%) (Auto)


 


 0.5 %


(0.0-2.0) 


 





 


Neutrophils # (Auto)


 


 4.7 10 ^3/uL


(1.6-8.6) 


 





 


Lymphocytes # (Auto)


 


 2.8 10 ^3/uL


(0.4-5.4) 


 





 


Monocytes # (Auto)


 


 0.6 10 ^3/uL


(0-1.3) 


 





 


Eosinophils # (Auto)


 


 0.2 10 ^3/uL


(0-0.8) 


 





 


Basophils # (Auto)


 


 0 10 ^3/uL


(0-0.2) 


 





 


Nucleated Red Blood Cells  0.1 %   


 


Sodium Level


 


 138 mmol/L


(136-145) 


 





 


Potassium Level


 


 3.8 mmol/L


(3.5-5.1) 


 





 


Chloride Level


 


 100 mmol/L


() 


 





 


Carbon Dioxide Level


 


 28 mmol/L


(20-31) 


 





 


Anion Gap  10 (5-15)   


 


Blood Urea Nitrogen


 


 15 mg/dL


(9-23) 


 





 


Creatinine


 


 1.06 mg/dL


(0.700-1.30) 


 





 


Glomerular Filtration Rate


Calc 


 86 mL/min


(>90) 


 





 


BUN/Creatinine Ratio


 


 14.2


(10.0-20.0) 


 





 


Serum Glucose


 


 97 mg/dL


() 


 





 


Calcium Level


 


 9.1 mg/dL


(8.7-10.4) 


 





 


Total Bilirubin


 


 1.0 mg/dL


(0.2-1.0) 


 





 


Aspartate Amino Transferase


(AST) 


 17 U/L (13-40) 


 


 





 


Alanine Aminotransferase (ALT)  17 U/L (7-40)   


 


Alkaline Phosphatase


 


 62 U/L


() 


 





 


Total Protein


 


 7.8 g/dL


(5.7-8.2) 


 





 


Albumin


 


 3.7 g/dL


(3.2-4.8) 


 





 


Prothrombin Time


 


 


 12.4 sec


(9.3-11.8) 





 


Prothrombin Time INR


 


 


 1.19


(0.9-1.15) 





 


Activated Partial


Thromboplast Time 


 


 31.9 SEC


(24.5-34.5) 





 


Hemoglobin A1c


 


 


 6.1 % A1C


(<5.7) 





 


Magnesium Level


 


 


 2.2 mg/dL


(1.6-2.6) 





 


Troponin I High Sensitivity    8 ng/L (</=54) 


 


Test


 3/12/25


20:20 3/12/25


16:40 


 





 


Urine Color


 Light-yellow


(Yellow) 


 


 





 


Urine Clarity Clear (Clear)    


 


Urine pH 6.5 (5.0-9.0)    


 


Urine Specific Gravity


 1.009


(1.001-1.035) 


 


 





 


Urine Protein


 Negative


(Negative) 


 


 





 


Urine Ketones


 Negative


(Negative) 


 


 





 


Urine Blood


 Negative /uL


(Negative) 


 


 





 


Urine Nitrite


 Negative


(Negative) 


 


 





 


Urine Bilirubin


 Negative


(Negative) 


 


 





 


Urine Urobilinogen


 Normal mg/dL


(Negative) 


 


 





 


Urine Leukocyte Esterase


 Trace /uL


(Negative) 


 


 





 


Urine RBC 1 /hpf (0 - 3)    


 


Urine Microscopic WBC 3 /HPF (0-3)    


 


Urine Squamous Epithelial


Cells Few /hpf (<5) 


 


 


 





 


Urine Bacteria


 None seen /hpf


(None Seen) 


 


 





 


Urine Glucose


 Normal mg/dL


(Normal) 


 


 





 


Urine Opiates Screen Neg (NEGATIVE)    


 


Urine Fentanyl Screen Neg (NEGATIVE)    


 


Urine Barbiturates Screen Neg (NEGATIVE)    


 


Urine Phencyclidine Screen Neg (NEGATIVE)    


 


Urine Amphetamines Screen Neg (NEGATIVE)    


 


Urine Benzodiazepines Screen Neg (NEGATIVE)    


 


Urine Cocaine Screen Neg (NEGATIVE)    


 


Urine Cannabinoids Screen Neg (NEGATIVE)    


 


B-Type Natriuretic Peptide


 


 155.73 pg/mL


(0-100) 


 





 


Thyroid Stimulating Hormone


(TSH) 


 2.05 uIU/mL


(0.55-4.78) 


 








                             Other Laboratory Tests


3/14/25 04:49











Brief Hx & Hospital Course:


Benjamin Shearer is a 49-year-old male with a PMH of newly diagnosed CHF, AFib 

presented the ED with the chief complaints palpitations and chest pain which 

started on the day of admission.  Patient reported that he recently discharged 

from this facility after being diagnosed CHF and AFib and yesterday he went to 

follow up his PCP office then he started having palpitations and difficulty in 

catching breath along with chest pain which is substernal, reproducible with 

almost touching and no radiation or aggravating or relieving factors.  Patient 

reported he missed his medications on the day of admission.  On my assessment 

patient denies fever, nausea, vomiting, diaphoresis, dizziness, other associated

symptoms 


  Patient required hospital outpatient for further management chest pain.  

Twelve lead EKG showed no ST changes but AFib.   troponins were negative.  BNP 

elevated, patient is started on GDMT. echo showed LVEF  30% . Due to   AFib 

patient was under rate control and giving therapeutic Lovenox.  CXR showed 

cardiomegaly with CHF. We will initiated the patient on guideline directed 

medical therapy for CHF as tolerated.  Given the patient's newly diagnosed 

congestive heart failure with reduced ejection fraction, the patient may qualify

for ischemic workup.  At the time of assessment, due to given weight of patient 

the patient may need to be transferred to higher level of care for ischemic 

workup given that our facility uses cardiac catheterization equipment that can 

only hold up to 350lbs.  The patient may also qualify for LifeVest prior to 

discharge.   Patient is stable to transfer.





Pt is lying on bed





General Appearance:  Alert, Oriented X3, Cooperative, Not in acute distress


HEENT:  Atraumatic, Mucous membranes moist/pink


Respiratory:  Clear to auscultation, Normal air movement,Crackles in the lower 

lobes bilateral


Cardiovascular:  Regular rate, Normal S1, Normal S2, No murmurs


Abdominal:   Active bowel sounds, Soft, no distention, no tenderness


Extremities:  No edema, Normal pulses, No tenderness/swelling


Skin:  No   Significant rash, except past surgical scars


Neuro:  Normal speech,  sensorimotor deficits none


Psych/Mental Status:  Mental status NL, Mood NL


Nurse was there as sharperone during examination





Operations or Procedures


XY CHEST TWO VIEWS ROUTINE


IMPRESSION: 


Cardiomegaly with mild CHF





Condition at Discharge:


Stable





Final Diagnosis/Problems List





# Chest pain R/o ACS


# Acute on chronic HFrEF


# Afib with RVR  with a secondary hypercoagulable state on eliquis


# morbid obesity with a BMI 51.2





Discharge Disposition:


Acute Care Facility





Discharge Instruct/Medications


Diet:  Cardiac 2g Na,low cholest


Activity:  No Restrictions, As Tolerated


Follow Up/Referral:  


Patient being transferring to [facility] for left heart 


catheterization


Medications:  


Per Transfer Notes


Discharge Statement:


"Patient was advised to return to the ER or call 911 if any headaches, 

dizziness, shortness of breath, chest pain, abdominal pain, bleeding, fevers, or

worsening of medical condition.





Patient was counseled about treatment plan, medications, possible side effects, 

patientverbalized understanding. All questions were answered to the best of my 

ability.





This discharge took greater then 30 minutes in planning, reviewing 

documentation, counseling the patient, and discussing with other team members."





ASSESSMENT


Acute on chronic HFrEF





Date of Service:  Mar 14, 2025


Billing Provider:  JOSELUIS AGUILLON MD


Common Visit Codes:  [id number]-UUF/[id number] DISCH DAY >30min











CARO ROD RESIDENT            Mar 15, 2025 11:31


JOSELUIS AGUILLON MD            Mar 15, 2025 16:50

## 2025-03-15 NOTE — DVHPN2
Progress Note - Dictate


Date Seen:  Mar 15, 2025


Medical Necessity Reason


Pt with a Central, PICC or Fol:  No


Subjective


Patient was seen and evaluated in follow up. No overnight events. Patient denies

any pain or discomfort. Patient is awaiting for transfer to [facility]. No beds 

available at this time. VS are stable. 


Telemetry reviewed.


vital signs





                                   Vital Sign








  Date Time  Temp Pulse Resp B/P (MAP) Pulse Ox O2 Delivery O2 Flow Rate FiO2


 


3/15/25 17:24    107/69    


 


3/15/25 17:00 97.2 72 18  94   





 97.2       


 


3/15/25 08:10      Nasal Cannula* 3 32














                           Total Intake and Output   


 


 3/14/25 3/14/25 3/15/25





 15:00 23:00 07:00


 


Intake Total 720 ml 720 ml 400 ml


 


Output Total  800 ml 1600 ml


 


Balance 720 ml -80 ml -1200 ml








medications





                               Current Medications








 Medications  Dose


 Ordered  Sig/Hardy


 Route  Start Time


 Stop Time Status Last Admin


Dose Admin


 


 Nitroglycerin  0.4 mg  Q5MINP  PRN


 SL  3/12/25 22:00


     





 


 Morphine Sulfate  2 mg  Q30M  PRN


 IV  3/12/25 22:00


     





 


 Furosemide  40 mg  BIDD


 IV  3/13/25 06:00


    3/15/25 17:24


40 MG


 


 Aspirin  81 mg  DAILY


 PO  3/13/25 10:00


    3/15/25 11:16


81 MG


 


 Carvedilol  12.5 mg  Q12HR


 PO  3/12/25 22:00


    3/15/25 11:14


12.5 MG


 


 Sacubitril/


 Valsartan  1 tab  BID


 PO  3/12/25 22:00


    3/15/25 11:15


1 TAB


 


 Enoxaparin Sodium  150 mg  Q12HR


 SC  3/13/25 10:00


    3/15/25 11:16


150 MG


 


 Spironolactone  25 mg  DAILY


 PO  3/13/25 10:00


    3/15/25 11:15


25 MG


 


 Empaglifozin  10 mg  DAILY


 PO  3/13/25 10:00


    3/15/25 11:14


10 MG








objective


GENERAL: Awake, alert, oriented.


LUNGS: Clear.


CARDIOVASCULAR: Heart sounds are good.


ABDOMEN: Soft.


laboratory and microbiology


                                Laboratory Tests


3/14/25 04:49

















Test


 3/14/25


04:49 Range/Units


 


 


Serum Glucose 97    mg/dL








Problem List


Shortness of breath, rule out coronary artery disease.


Acute on chronic HFrEF, NYHA class III.


Atrial fibrillation (on Eliquis).


Morbid obesity.


Assessment/Plan


Continued all current supportive medical care.   


Aspirin.


Coreg.


Entresto.


Diuretics with Lasix. 


Morphine for pain management. 


Additional plan as per the hospital course.


Plan discussed with:  Patient











SPENCER CHAPA MD       Mar 15, 2025 21:16

## 2025-03-16 NOTE — DVHPNRES
Progress Note


Date Seen:  Mar 16, 2025


Resident Creating Document:  AC BAKER RESIDENT


Medical Necessity Reason


Pt with a Central, PICC or Fol:  No





Subjective


Review of Systems





Patient was seen and examined on the bedside.  He is alert oriented x3.  

Complaint of palpitation.  Telemetry stream revealed the patient had an episode 

of V-tach this morning around 8:33 a.m. and heart rate was 130.  Patient is 

waiting for transfer to [facility] for further ischemia workup .





Objective


vital signs





                                   Vital Sign








  Date Time  Temp Pulse Resp B/P (MAP) Pulse Ox O2 Delivery O2 Flow Rate FiO2


 


3/16/25 10:00  73  95/64    


 


3/16/25 09:00 97.7  18  96   





 97.7       


 


3/15/25 20:00      Nasal Cannula* 3 32














                           Total Intake and Output   


 


 3/15/25 3/15/25 3/16/25





 15:00 23:00 07:00


 


Intake Total  1104 ml 150 ml


 


Output Total  2550 ml 1550 ml


 


Balance  -1446 ml -1400 ml








medications





                               Current Medications








 Medications  Dose


 Ordered  Sig/Hardy


 Route  Start Time


 Stop Time Status Last Admin


Dose Admin


 


 Nitroglycerin  0.4 mg  Q5MINP  PRN


 SL  3/12/25 22:00


     





 


 Morphine Sulfate  2 mg  Q30M  PRN


 IV  3/12/25 22:00


     





 


 Furosemide  40 mg  BIDD


 IV  3/13/25 06:00


    3/16/25 05:18


40 MG


 


 Aspirin  81 mg  DAILY


 PO  3/13/25 10:00


    3/16/25 10:15


81 MG


 


 Carvedilol  12.5 mg  Q12HR


 PO  3/12/25 22:00


    3/15/25 11:14


12.5 MG


 


 Sacubitril/


 Valsartan  1 tab  BID


 PO  3/12/25 22:00


    3/16/25 10:15


1 TAB


 


 Enoxaparin Sodium  150 mg  Q12HR


 SC  3/13/25 10:00


    3/16/25 10:16


150 MG


 


 Spironolactone  25 mg  DAILY


 PO  3/13/25 10:00


    3/16/25 10:15


25 MG


 


 Empaglifozin  10 mg  DAILY


 PO  3/13/25 10:00


    3/16/25 10:15


10 MG








Examination





Physical examination:








General Appearance:  Alert, Oriented X3, Cooperative, No acute distress


HEENT:  Atraumatic, PERRLA, EOMI, Mucous membrane moist/pink


Respiratory:  Clear to auscultation, Normal air movement


Cardiovascular:  Regular rate, Normal S1, Normal S2, No murmurs, no chest wall 

tenderness


Abdominal:  Normal bowel sounds, Soft, No tenderness, No hepatospenomegaly, No 

masses


Extremities:  No clubbing, No cyanosis, No edema, Normal pulses, No 

tenderness/swelling


Skin:  No rashes, No breakdown, No significant lesion


Neuro:  Normal gait, Normal speech, Strength at 5/5 X4 ext, Normal tone, 

Sensation intact, Cranial nerves 3-12 NL, Reflexes 2+


Psych/Mental Status:  Mental status NL, Mood NL


laboratory and microbiology


                                Laboratory Tests


3/14/25 04:49

















Test


 3/14/25


04:49 Range/Units


 


 


Serum Glucose 97    mg/dL








                                  Microbiology








 Date/Time


Source Procedure


Growth Status





 


 3/12/25 22:05


Nose MRSA Screen - Final


 Complete








Labs and/or images reviewed:  Labs reviewed by me, Image(s) reviewed by me





Problem List/Assessment/Plan


Problem List/Assessment/Plan


Assessment and plan:





# Acute Chest pain rule out  ACS


- troponins x3 were negative


- reviewed EKG showed AFib but no ST changes


- consulted cardiology for ischemic workup


- ordered chest pain protocol





# Newly diagnosed Acute on chronic HFrEF


- recent echo showed EF 30% with LVH


- GDM T as tolerated Jardiance, Aldactone, Entresto, Coreg


- Lasix IV 40 mg b.i.d.


- Cardiology recommended ischemia workup for newly diagnosed HFrEF


- The patient may need to be transferred to higher level of care for ischemic 

workup given that our facility uses cardiac catheterization equipment that can 

only hold up to 350lbs





# Afib with RVR  with a secondary hypercoagulable state


- monitoring on telemetry


- Patient had an episode of V-tach today and heart rate was 133


- EKG showed AFib with RVR but no ST changes


- currently on therapeutic Lovenox bid





# morbid obesity with a BMI 51.2


- nutritional counseling


- counseled regarding lifestyle modifications including diet and exercise








GI ppx:  Not indicated 


VTE PPX:  Currently on therapeutic Lovenox


Diet:  Cardiac 


Home medications reconciled 





Goals of care discussed with the patient for more than 29 minutes:  Full code 

status 





Plan discussed with Dr. Barron


Plan discussed with:  Patient, Other





Date of Service:  Mar 16, 2025


Billing Provider:  JOSELUIS BARRON MD


Common Visit Codes:  [id number] INP/OBS CARE(MOD)











AC BAKER RESIDENT        Mar 16, 2025 11:49


JOSELUIS BARRON MD            Mar 16, 2025 14:57

## 2025-03-16 NOTE — DVHPN2
Progress Note - Dictate


Date Seen:  Mar 16, 2025


Medical Necessity Reason


Pt with a Central, PICC or Fol:  No


Subjective


Patient was seen and evaluated in follow up. Patient is complaining of 

palpitations. The patient had an episode of V-tach this morning around 8:33 a.m.

and heart rate was 130. Patient awaiting transfer to [facility] 

for further ischemia workup.


Telemetry reviewed.


vital signs





                                   Vital Sign








  Date Time  Temp Pulse Resp B/P (MAP) Pulse Ox O2 Delivery O2 Flow Rate FiO2


 


3/16/25 17:24    97/69    


 


3/16/25 17:00 97.7 78 18  97   





 97.7       


 


3/16/25 08:10      Nasal Cannula* 3 32














                           Total Intake and Output   


 


 3/15/25 3/15/25 3/16/25





 15:00 23:00 07:00


 


Intake Total  1104 ml 150 ml


 


Output Total  2550 ml 1550 ml


 


Balance  -1446 ml -1400 ml








medications





                               Current Medications








 Medications  Dose


 Ordered  Sig/Hardy


 Route  Start Time


 Stop Time Status Last Admin


Dose Admin


 


 Nitroglycerin  0.4 mg  Q5MINP  PRN


 SL  3/12/25 22:00


     





 


 Morphine Sulfate  2 mg  Q30M  PRN


 IV  3/12/25 22:00


     





 


 Furosemide  40 mg  BIDD


 IV  3/13/25 06:00


    3/16/25 17:24


40 MG


 


 Aspirin  81 mg  DAILY


 PO  3/13/25 10:00


    3/16/25 10:15


81 MG


 


 Carvedilol  12.5 mg  Q12HR


 PO  3/12/25 22:00


    3/15/25 11:14


12.5 MG


 


 Sacubitril/


 Valsartan  1 tab  BID


 PO  3/12/25 22:00


    3/16/25 10:15


1 TAB


 


 Enoxaparin Sodium  150 mg  Q12HR


 SC  3/13/25 10:00


    3/16/25 10:16


150 MG


 


 Spironolactone  25 mg  DAILY


 PO  3/13/25 10:00


    3/16/25 10:15


25 MG


 


 Empaglifozin  10 mg  DAILY


 PO  3/13/25 10:00


    3/16/25 10:15


10 MG








objective


GENERAL: Awake, alert, oriented.


LUNGS: Clear.


CARDIOVASCULAR: Heart sounds are good.


ABDOMEN: Soft.


laboratory and microbiology


                                Laboratory Tests


3/14/25 04:49

















Test


 3/14/25


04:49 Range/Units


 


 


Serum Glucose 97    mg/dL








Problem List


Shortness of breath, rule out coronary artery disease.


Acute on chronic HFrEF, NYHA class III.


Atrial fibrillation (on Eliquis).


Morbid obesity.


Assessment/Plan


Continued all current supportive medical care.   


Aspirin.


Coreg.


Entresto.


Diuretics with Lasix. 


Morphine for pain management. 


Additional plan as per the hospital course.





Dietary Evaluation Review


Recommendations by RD:  Dietary education by RD


Comments:  


1) Add 60g CCHO restriction to cardiac diet





2) Continue to encourage optimal PO intake





3) Refer to outpatient RD/[facility] for weight management and d/t prediabetes 


4) Encourage physical activity as tolerated to promote weight loss and  


glycemic control





5) Continue to monitor I&O, labs, and skin integrity


Expected Outcomes/Goals:  


1) appetite and labs to improve





2) F/u in 3-5 days


Plan discussed with:  Patient











SPENCER CHAPA MD       Mar 16, 2025 19:11

## 2025-03-17 NOTE — DVHPN2
Progress Note - Dictate


Date Seen:  Mar 17, 2025


Medical Necessity Reason


Pt with a Central, PICC or Fol:  No


Subjective


Patient was seen and evaluated in follow up. No overnight events. Patietn is 

resting in bed. CBC and chemistry are unremarkable. Patient awaiting transfer to

[facility] for further ischemia workup.


Telemetry reviewed.


vital signs





                                   Vital Sign








  Date Time  Temp Pulse Resp B/P (MAP) Pulse Ox O2 Delivery O2 Flow Rate FiO2


 


3/17/25 08:59 97.9 75 19 93/74 (80) 95   





 97.9       


 


3/16/25 19:39      Nasal Cannula* 3 32














                           Total Intake and Output   


 


 3/16/25 3/16/25 3/17/25





 15:00 23:00 07:00


 


Intake Total 610 ml 1105 ml 0 ml


 


Output Total  1275 ml 1600 ml


 


Balance 610 ml -170 ml -1600 ml








medications





                               Current Medications








 Medications  Dose


 Ordered  Sig/Hardy


 Route  Start Time


 Stop Time Status Last Admin


Dose Admin


 


 Nitroglycerin  0.4 mg  Q5MINP  PRN


 SL  3/12/25 22:00


     





 


 Morphine Sulfate  2 mg  Q30M  PRN


 IV  3/12/25 22:00


     





 


 Furosemide  40 mg  BIDD


 IV  3/13/25 06:00


    3/17/25 05:54


40 MG


 


 Aspirin  81 mg  DAILY


 PO  3/13/25 10:00


    3/17/25 10:48


81 MG


 


 Carvedilol  12.5 mg  Q12HR


 PO  3/12/25 22:00


    3/16/25 21:44


12.5 MG


 


 Sacubitril/


 Valsartan  1 tab  BID


 PO  3/12/25 22:00


    3/17/25 10:47


1 TAB


 


 Enoxaparin Sodium  150 mg  Q12HR


 SC  3/13/25 10:00


    3/17/25 10:48


150 MG


 


 Spironolactone  25 mg  DAILY


 PO  3/13/25 10:00


    3/17/25 10:47


25 MG


 


 Empaglifozin  10 mg  DAILY


 PO  3/13/25 10:00


    3/17/25 10:47


10 MG








objective


GENERAL: Awake, alert, oriented.


LUNGS: Clear.


CARDIOVASCULAR: Heart sounds are good.


ABDOMEN: Soft.


laboratory and microbiology


                                Laboratory Tests


3/17/25 06:39

















Test


 3/17/25


06:39 Range/Units


 


 


Serum Glucose 93    mg/dL








Problem List


Shortness of breath, rule out coronary artery disease.


Acute on chronic HFrEF, NYHA class III.


Atrial fibrillation (on Eliquis).


Morbid obesity.


Assessment/Plan


Continued all current supportive medical care.   


Aspirin.


Coreg.


Entresto.


Diuretics with Lasix. 


Morphine for pain management. 


Additional plan as per the hospital course.





Dietary Evaluation Review


Recommendations by RD:  Dietary education by RD


Comments:  


1) Add 60g CCHO restriction to cardiac diet





2) Continue to encourage optimal PO intake





3) Refer to outpatient RD/[facility] for weight management and d/t prediabetes 


4) Encourage physical activity as tolerated to promote weight loss and  


glycemic control





5) Continue to monitor I&O, labs, and skin integrity


Expected Outcomes/Goals:  


1) appetite and labs to improve





2) F/u in 3-5 days


Plan discussed with:  Patient











SPENCER CHAPA MD       Mar 17, 2025 13:34

## 2025-03-17 NOTE — DVHPNRES
Progress Note


Date Seen:  Mar 17, 2025


Resident Creating Document:  CARO ROD RESIDENT


Medical Necessity Reason


Pt with a Central, PICC or Fol:  No





Subjective


Review of Systems


Patient was seen and examined on the bedside.  He is alert oriented x3.  No new 

complaints. 


 Patient is waiting for transfer to [facility] for further 

ischemia workup .





Objective


vital signs





                                   Vital Sign








  Date Time  Temp Pulse Resp B/P (MAP) Pulse Ox O2 Delivery O2 Flow Rate FiO2


 


3/17/25 08:59 97.9 75 19 93/74 (80) 95   





 97.9       


 


3/16/25 19:39      Nasal Cannula* 3 32














                           Total Intake and Output   


 


 3/16/25 3/16/25 3/17/25





 15:00 23:00 07:00


 


Intake Total 610 ml 1105 ml 0 ml


 


Output Total  1275 ml 1600 ml


 


Balance 610 ml -170 ml -1600 ml








medications





                               Current Medications








 Medications  Dose


 Ordered  Sig/Hardy


 Route  Start Time


 Stop Time Status Last Admin


Dose Admin


 


 Nitroglycerin  0.4 mg  Q5MINP  PRN


 SL  3/12/25 22:00


     





 


 Morphine Sulfate  2 mg  Q30M  PRN


 IV  3/12/25 22:00


     





 


 Furosemide  40 mg  BIDD


 IV  3/13/25 06:00


    3/17/25 05:54


40 MG


 


 Aspirin  81 mg  DAILY


 PO  3/13/25 10:00


    3/17/25 10:48


81 MG


 


 Carvedilol  12.5 mg  Q12HR


 PO  3/12/25 22:00


    3/16/25 21:44


12.5 MG


 


 Sacubitril/


 Valsartan  1 tab  BID


 PO  3/12/25 22:00


    3/17/25 10:47


1 TAB


 


 Enoxaparin Sodium  150 mg  Q12HR


 SC  3/13/25 10:00


    3/17/25 10:48


150 MG


 


 Spironolactone  25 mg  DAILY


 PO  3/13/25 10:00


    3/17/25 10:47


25 MG


 


 Empaglifozin  10 mg  DAILY


 PO  3/13/25 10:00


    3/17/25 10:47


10 MG








Examination


General Appearance:  Alert, Oriented X3, Cooperative, No acute distress


HEENT:  Atraumatic, PERRLA, EOMI, Mucous membrane moist/pink


Respiratory:  Clear to auscultation, Normal air movement


Cardiovascular:  Regular rate, Normal S1, Normal S2, No murmurs, no chest wall 

tenderness


Abdominal:  Normal bowel sounds, Soft, No tenderness, No hepatospenomegaly, No 

masses


Extremities:  No clubbing, No cyanosis, No edema, Normal pulses, No 

tenderness/swelling


Skin:  No rashes, No breakdown, No significant lesion


Neuro:  Normal gait, Normal speech, no sensory motor deficits


Psych/Mental Status:  Mental status NL, Mood NL


laboratory and microbiology


                                Laboratory Tests


3/17/25 06:39

















Test


 3/17/25


06:39 Range/Units


 


 


Serum Glucose 93    mg/dL








                                  Microbiology








 Date/Time


Source Procedure


Growth Status





 


 3/12/25 22:05


Nose MRSA Screen - Final


 Complete








Labs and/or images reviewed:  Labs reviewed by me, Image(s) reviewed by me





Problem List/Assessment/Plan


Problem List/Assessment/Plan


# Chest pain R/o ACS


- troponins x3 were negative


- reviewed EKG showed AFib but no ST changes


- consulted cardiology for ischemic workup


- ordered chest pain protocol





# Acute on chronic HFrEF


- recent echo showed EF 30% with LVH


- GDM T as tolerated Jardiance, Aldactone, Entresto, Coreg


- Lasix IV 40 mg b.i.d.


- Cardiology consult for probable ischemic workup





# Afib with RVR  with a secondary hypercoagulable state


- monitoring on telemetry


- EKG showed AFib with RVR but no ST changes


- currently on therapeutic Lovenox bid





# morbid obesity with a BMI 51.2


- nutritional counseling


- counseled regarding lifestyle modifications including diet and exercise








GI ppx:  Not indicated 


VTE PPX:  Currently on therapeutic Lovenox


Diet:  Cardiac 


Home medications reconciled 





Goals of care discussed with the patient for more than 29 minutes:  Full code 

status 


Case discussed with Dr. Silveira, patient and nurse. The patient may need to be 

transferred to higher level of care for ischemic workup given that our facility 

uses cardiac catheterization equipment that can only hold up to 350lbs.


Plan discussed with:  Patient





Dietary Evaluation Review


Recommendations by RD:  Dietary education by RD


Comments:  


1) Add 60g CCHO restriction to cardiac diet





2) Continue to encourage optimal PO intake





3) Refer to outpatient RD/[facility] for weight management and d/t prediabetes 


4) Encourage physical activity as tolerated to promote weight loss and  


glycemic control





5) Continue to monitor I&O, labs, and skin integrity


Expected Outcomes/Goals:  


1) appetite and labs to improve





2) F/u in 3-5 days











CARO ROD RESIDENT            Mar 17, 2025 14:24

## 2025-03-18 NOTE — DVHPN2
Progress Note - Dictate


Date Seen:  Mar 18, 2025


Medical Necessity Reason


Pt with a Central, PICC or Fol:  No


Subjective


Patient was seen and evaluated in follow up. The patient denies any new 

complaints. Patient is on 3 LPM NC. Patient awaiting transfer to [facility] for further ischemia workup.


Telemetry reviewed.


vital signs





                                   Vital Sign








  Date Time  Temp Pulse Resp B/P (MAP) Pulse Ox O2 Delivery O2 Flow Rate FiO2


 


3/18/25 20:00      Nasal Cannula* 3 32


 


3/18/25 17:00 98.3 65 18 100/70 (80) 99   





 98.3       














                           Total Intake and Output   


 


 3/17/25 3/17/25 3/18/25





 15:00 23:00 07:00


 


Intake Total 560 ml 935 ml 140 ml


 


Output Total  2200 ml 1350 ml


 


Balance 560 ml -1265 ml -1210 ml








medications





                               Current Medications








 Medications  Dose


 Ordered  Sig/Hardy


 Route  Start Time


 Stop Time Status Last Admin


Dose Admin


 


 Nitroglycerin  0.4 mg  Q5MINP  PRN


 SL  3/12/25 22:00


     





 


 Morphine Sulfate  2 mg  Q30M  PRN


 IV  3/12/25 22:00


     





 


 Furosemide  40 mg  BIDD


 IV  3/13/25 06:00


    3/18/25 05:43


40 MG


 


 Aspirin  81 mg  DAILY


 PO  3/13/25 10:00


    3/18/25 11:05


81 MG


 


 Carvedilol  12.5 mg  Q12HR


 PO  3/12/25 22:00


    3/17/25 22:10


12.5 MG


 


 Sacubitril/


 Valsartan  1 tab  BID


 PO  3/12/25 22:00


    3/18/25 11:05


1 TAB


 


 Enoxaparin Sodium  150 mg  Q12HR


 SC  3/13/25 10:00


    3/18/25 11:05


150 MG


 


 Spironolactone  25 mg  DAILY


 PO  3/13/25 10:00


    3/18/25 11:05


25 MG


 


 Empaglifozin  10 mg  DAILY


 PO  3/13/25 10:00


    3/18/25 11:06


10 MG


 


 Throat Lozenges  1 janelle  Q2HP  PRN


 MT  3/17/25 18:15


    3/17/25 18:57


1 JANELLE








objective


GENERAL: Awake, alert, oriented.


LUNGS: Clear.


CARDIOVASCULAR: Heart sounds are good.


ABDOMEN: Soft.


laboratory and microbiology


                                Laboratory Tests


3/17/25 06:39

















Test


 3/17/25


06:39 Range/Units


 


 


Serum Glucose 93    mg/dL








Problem List


Shortness of breath, rule out coronary artery disease.


Acute on chronic HFrEF, NYHA class III.


Atrial fibrillation (on Eliquis).


Morbid obesity.


Assessment/Plan


Continued all current supportive medical care.   


Aspirin.


Coreg.


Entresto.


Diuretics with Lasix. 


Morphine for pain management. 


Additional plan as per the hospital course.





Dietary Evaluation Review


Recommendations by RD:  Dietary education by RD


Comments:  


1) Add 60g CCHO restriction to cardiac diet





2) Continue to encourage optimal PO intake





3) Refer to outpatient RD/[facility] for weight management and d/t prediabetes 


4) Encourage physical activity as tolerated to promote weight loss and  


glycemic control





5) Continue to monitor I&O, labs, and skin integrity


Expected Outcomes/Goals:  


1) appetite and labs to improve





2) F/u in 3-5 days


Plan discussed with:  Patient











SPENCER CHAPA MD       Mar 18, 2025 21:59

## 2025-03-18 NOTE — DVHPNRES
Progress Note


Date Seen:  Mar 18, 2025


Resident Creating Document:  CARO ROD RESIDENT


Medical Necessity Reason


Pt with a Central, PICC or Fol:  No





Subjective


Review of Systems





Patient was seen and examined on the bedside.  He is alert oriented x3.  No new 

complaints. 


 Patient is waiting for transfer to [facility] for further ischemia workup .


Patient reports:  No new complaints





Objective


vital signs





                                   Vital Sign








  Date Time  Temp Pulse Resp B/P (MAP) Pulse Ox O2 Delivery O2 Flow Rate FiO2


 


3/18/25 10:00  57  94/61    


 


3/18/25 09:00 98.1  19  96   





 98.1       


 


3/18/25 08:00      Nasal Cannula* 3 32














                           Total Intake and Output   


 


 3/17/25 3/17/25 3/18/25





 15:00 23:00 07:00


 


Intake Total 560 ml 935 ml 140 ml


 


Output Total  2200 ml 1350 ml


 


Balance 560 ml -1265 ml -1210 ml








medications





                               Current Medications








 Medications  Dose


 Ordered  Sig/Hardy


 Route  Start Time


 Stop Time Status Last Admin


Dose Admin


 


 Nitroglycerin  0.4 mg  Q5MINP  PRN


 SL  3/12/25 22:00


     





 


 Morphine Sulfate  2 mg  Q30M  PRN


 IV  3/12/25 22:00


     





 


 Furosemide  40 mg  BIDD


 IV  3/13/25 06:00


    3/18/25 05:43


40 MG


 


 Aspirin  81 mg  DAILY


 PO  3/13/25 10:00


    3/18/25 11:05


81 MG


 


 Carvedilol  12.5 mg  Q12HR


 PO  3/12/25 22:00


    3/17/25 22:10


12.5 MG


 


 Sacubitril/


 Valsartan  1 tab  BID


 PO  3/12/25 22:00


    3/18/25 11:05


1 TAB


 


 Enoxaparin Sodium  150 mg  Q12HR


 SC  3/13/25 10:00


    3/18/25 11:05


150 MG


 


 Spironolactone  25 mg  DAILY


 PO  3/13/25 10:00


    3/18/25 11:05


25 MG


 


 Empaglifozin  10 mg  DAILY


 PO  3/13/25 10:00


    3/18/25 11:06


10 MG


 


 Throat Lozenges  1 janelle  Q2HP  PRN


 MT  3/17/25 18:15


    3/17/25 18:57


1 JANELLE








Examination


General Appearance:  Alert, Oriented X3, Cooperative, No acute distress


HEENT:  Atraumatic, PERRLA, EOMI, Mucous membrane moist/pink


Respiratory:  Clear to auscultation, Normal air movement


Cardiovascular:  Regular rate, Normal S1, Normal S2, No murmurs, no chest wall 

tenderness


Abdominal:  Normal bowel sounds, Soft, No tenderness, No hepatospenomegaly, No 

masses


Extremities:  No clubbing, No cyanosis, No edema, Normal pulses, No 

tenderness/swelling


Skin:  No rashes, No breakdown, No significant lesion


Neuro:  Normal gait, Normal speech, no sensory motor deficits


Psych/Mental Status:  Mental status NL, Mood NL


laboratory and microbiology


                                Laboratory Tests


3/17/25 06:39

















Test


 3/17/25


06:39 Range/Units


 


 


Serum Glucose 93    mg/dL








                                  Microbiology








 Date/Time


Source Procedure


Growth Status





 


 3/12/25 22:05


Nose MRSA Screen - Final


 Complete








Labs and/or images reviewed:  Labs reviewed by me, Image(s) reviewed by me





Problem List/Assessment/Plan


Problem List/Assessment/Plan


# Chest pain R/o ACS


- troponins x3 were negative


- reviewed EKG showed AFib but no ST changes


- consulted cardiology for ischemic workup


- ordered chest pain protocol





# Acute on chronic HFrEF


- recent echo showed EF 30% with LVH


- GDM T as tolerated Jardiance, Aldactone, Entresto, Coreg


- Lasix IV 40 mg b.i.d.


- Cardiology consult for probable ischemic workup





# Afib with RVR  with a secondary hypercoagulable state


- monitoring on telemetry


- EKG showed AFib with RVR but no ST changes


- currently on therapeutic Lovenox bid





# morbid obesity with a BMI 51.2


- nutritional counseling


- counseled regarding lifestyle modifications including diet and exercise








GI ppx:  Not indicated 


VTE PPX:  Currently on therapeutic Lovenox


Diet:  Cardiac 


Home medications reconciled 





Goals of care discussed with the patient for more than 29 minutes:  Full code 

status 


Case discussed with Dr. Silveira, patient and nurse. The patient may need to be 

transferred to higher level of care for ischemic workup given that our facility 

uses cardiac catheterization equipment that can only hold up to 350lbs.


Plan discussed with:  Patient





Dietary Evaluation Review


Recommendations by RD:  Dietary education by RD


Comments:  


1) Add 60g CCHO restriction to cardiac diet





2) Continue to encourage optimal PO intake





3) Refer to outpatient RD/[facility] for weight management and d/t prediabetes 


4) Encourage physical activity as tolerated to promote weight loss and  


glycemic control





5) Continue to monitor I&O, labs, and skin integrity


Expected Outcomes/Goals:  


1) appetite and labs to improve





2) F/u in 3-5 days











CARO ROD RESIDENT            Mar 18, 2025 14:14